# Patient Record
Sex: MALE | Race: WHITE | NOT HISPANIC OR LATINO | Employment: OTHER | URBAN - METROPOLITAN AREA
[De-identification: names, ages, dates, MRNs, and addresses within clinical notes are randomized per-mention and may not be internally consistent; named-entity substitution may affect disease eponyms.]

---

## 2021-12-16 ENCOUNTER — HOSPITAL ENCOUNTER (OUTPATIENT)
Facility: HOSPITAL | Age: 41
Discharge: HOME | End: 2021-12-18
Attending: EMERGENCY MEDICINE | Admitting: INTERNAL MEDICINE

## 2021-12-16 DIAGNOSIS — K62.5 RECTAL BLEEDING: ICD-10-CM

## 2021-12-16 DIAGNOSIS — D72.829 LEUKOCYTOSIS, UNSPECIFIED TYPE: ICD-10-CM

## 2021-12-16 DIAGNOSIS — K52.9 COLITIS: Primary | ICD-10-CM

## 2021-12-16 DIAGNOSIS — R19.8 CHANGE IN BOWEL MOVEMENT: ICD-10-CM

## 2021-12-16 PROCEDURE — 96375 TX/PRO/DX INJ NEW DRUG ADDON: CPT

## 2021-12-16 PROCEDURE — 99284 EMERGENCY DEPT VISIT MOD MDM: CPT

## 2021-12-16 PROCEDURE — 25010000002 ONDANSETRON PER 1 MG: Performed by: EMERGENCY MEDICINE

## 2021-12-16 PROCEDURE — 80053 COMPREHEN METABOLIC PANEL: CPT | Performed by: EMERGENCY MEDICINE

## 2021-12-16 PROCEDURE — 83690 ASSAY OF LIPASE: CPT | Performed by: EMERGENCY MEDICINE

## 2021-12-16 PROCEDURE — 25010000002 MORPHINE PER 10 MG: Performed by: EMERGENCY MEDICINE

## 2021-12-16 PROCEDURE — 85025 COMPLETE CBC W/AUTO DIFF WBC: CPT | Performed by: EMERGENCY MEDICINE

## 2021-12-16 RX ORDER — SODIUM CHLORIDE 0.9 % (FLUSH) 0.9 %
10 SYRINGE (ML) INJECTION AS NEEDED
Status: DISCONTINUED | OUTPATIENT
Start: 2021-12-16 | End: 2021-12-18 | Stop reason: HOSPADM

## 2021-12-16 RX ORDER — MORPHINE SULFATE 2 MG/ML
4 INJECTION, SOLUTION INTRAMUSCULAR; INTRAVENOUS ONCE
Status: COMPLETED | OUTPATIENT
Start: 2021-12-16 | End: 2021-12-16

## 2021-12-16 RX ORDER — ONDANSETRON 2 MG/ML
4 INJECTION INTRAMUSCULAR; INTRAVENOUS ONCE
Status: COMPLETED | OUTPATIENT
Start: 2021-12-16 | End: 2021-12-16

## 2021-12-16 RX ADMIN — SODIUM CHLORIDE, POTASSIUM CHLORIDE, SODIUM LACTATE AND CALCIUM CHLORIDE 1000 ML: 600; 310; 30; 20 INJECTION, SOLUTION INTRAVENOUS at 23:45

## 2021-12-16 RX ADMIN — MORPHINE SULFATE 4 MG: 2 INJECTION, SOLUTION INTRAMUSCULAR; INTRAVENOUS at 23:48

## 2021-12-16 RX ADMIN — ONDANSETRON 4 MG: 2 INJECTION INTRAMUSCULAR; INTRAVENOUS at 23:48

## 2021-12-17 ENCOUNTER — APPOINTMENT (OUTPATIENT)
Dept: CT IMAGING | Facility: HOSPITAL | Age: 41
End: 2021-12-17

## 2021-12-17 ENCOUNTER — PREP FOR SURGERY (OUTPATIENT)
Dept: OTHER | Facility: HOSPITAL | Age: 41
End: 2021-12-17

## 2021-12-17 DIAGNOSIS — R19.8 CHANGE IN BOWEL MOVEMENT: Primary | ICD-10-CM

## 2021-12-17 PROBLEM — K52.9 COLITIS: Status: ACTIVE | Noted: 2021-12-17

## 2021-12-17 PROBLEM — K62.5 RECTAL BLEEDING: Status: ACTIVE | Noted: 2021-12-17

## 2021-12-17 LAB
ADV 40+41 DNA STL QL NAA+NON-PROBE: NOT DETECTED
ALBUMIN SERPL-MCNC: 4.2 G/DL (ref 3.5–5.2)
ALBUMIN/GLOB SERPL: 1.5 G/DL
ALP SERPL-CCNC: 21 U/L (ref 39–117)
ALT SERPL W P-5'-P-CCNC: 32 U/L (ref 1–41)
ANION GAP SERPL CALCULATED.3IONS-SCNC: 7.4 MMOL/L (ref 5–15)
ANION GAP SERPL CALCULATED.3IONS-SCNC: 9.8 MMOL/L (ref 5–15)
AST SERPL-CCNC: 18 U/L (ref 1–40)
ASTRO TYP 1-8 RNA STL QL NAA+NON-PROBE: NOT DETECTED
BASOPHILS # BLD AUTO: 0.04 10*3/MM3 (ref 0–0.2)
BASOPHILS # BLD AUTO: 0.04 10*3/MM3 (ref 0–0.2)
BASOPHILS NFR BLD AUTO: 0.2 % (ref 0–1.5)
BASOPHILS NFR BLD AUTO: 0.3 % (ref 0–1.5)
BILIRUB SERPL-MCNC: 0.5 MG/DL (ref 0–1.2)
BILIRUB UR QL STRIP: NEGATIVE
BUN SERPL-MCNC: 14 MG/DL (ref 6–20)
BUN SERPL-MCNC: 20 MG/DL (ref 6–20)
BUN/CREAT SERPL: 13.9 (ref 7–25)
BUN/CREAT SERPL: 17.9 (ref 7–25)
C CAYETANENSIS DNA STL QL NAA+NON-PROBE: NOT DETECTED
C COLI+JEJ+UPSA DNA STL QL NAA+NON-PROBE: NOT DETECTED
C DIFF TOX GENS STL QL NAA+PROBE: NEGATIVE
CALCIUM SPEC-SCNC: 8.6 MG/DL (ref 8.6–10.5)
CALCIUM SPEC-SCNC: 9.5 MG/DL (ref 8.6–10.5)
CHLORIDE SERPL-SCNC: 103 MMOL/L (ref 98–107)
CHLORIDE SERPL-SCNC: 103 MMOL/L (ref 98–107)
CLARITY UR: CLEAR
CO2 SERPL-SCNC: 26.6 MMOL/L (ref 22–29)
CO2 SERPL-SCNC: 29.2 MMOL/L (ref 22–29)
COLOR UR: YELLOW
CREAT SERPL-MCNC: 1.01 MG/DL (ref 0.76–1.27)
CREAT SERPL-MCNC: 1.12 MG/DL (ref 0.76–1.27)
CRYPTOSP DNA STL QL NAA+NON-PROBE: NOT DETECTED
D-LACTATE SERPL-SCNC: 0.8 MMOL/L (ref 0.5–2)
DEPRECATED RDW RBC AUTO: 42.3 FL (ref 37–54)
DEPRECATED RDW RBC AUTO: 42.7 FL (ref 37–54)
E HISTOLYT DNA STL QL NAA+NON-PROBE: NOT DETECTED
EAEC PAA PLAS AGGR+AATA ST NAA+NON-PRB: NOT DETECTED
EC STX1+STX2 GENES STL QL NAA+NON-PROBE: NOT DETECTED
EOSINOPHIL # BLD AUTO: 0.07 10*3/MM3 (ref 0–0.4)
EOSINOPHIL # BLD AUTO: 0.25 10*3/MM3 (ref 0–0.4)
EOSINOPHIL NFR BLD AUTO: 0.4 % (ref 0.3–6.2)
EOSINOPHIL NFR BLD AUTO: 1.8 % (ref 0.3–6.2)
EPEC EAE GENE STL QL NAA+NON-PROBE: NOT DETECTED
ERYTHROCYTE [DISTWIDTH] IN BLOOD BY AUTOMATED COUNT: 12.9 % (ref 12.3–15.4)
ERYTHROCYTE [DISTWIDTH] IN BLOOD BY AUTOMATED COUNT: 13.1 % (ref 12.3–15.4)
ETEC LTA+ST1A+ST1B TOX ST NAA+NON-PROBE: NOT DETECTED
G LAMBLIA DNA STL QL NAA+NON-PROBE: NOT DETECTED
GFR SERPL CREATININE-BSD FRML MDRD: 72 ML/MIN/1.73
GFR SERPL CREATININE-BSD FRML MDRD: 81 ML/MIN/1.73
GLOBULIN UR ELPH-MCNC: 2.8 GM/DL
GLUCOSE SERPL-MCNC: 102 MG/DL (ref 65–99)
GLUCOSE SERPL-MCNC: 118 MG/DL (ref 65–99)
GLUCOSE UR STRIP-MCNC: NEGATIVE MG/DL
HCT VFR BLD AUTO: 43.4 % (ref 37.5–51)
HCT VFR BLD AUTO: 51.2 % (ref 37.5–51)
HEMOCCULT STL QL: POSITIVE
HGB BLD-MCNC: 14.7 G/DL (ref 13–17.7)
HGB BLD-MCNC: 17 G/DL (ref 13–17.7)
HGB UR QL STRIP.AUTO: NEGATIVE
IMM GRANULOCYTES # BLD AUTO: 0.05 10*3/MM3 (ref 0–0.05)
IMM GRANULOCYTES # BLD AUTO: 0.15 10*3/MM3 (ref 0–0.05)
IMM GRANULOCYTES NFR BLD AUTO: 0.4 % (ref 0–0.5)
IMM GRANULOCYTES NFR BLD AUTO: 0.8 % (ref 0–0.5)
KETONES UR QL STRIP: NEGATIVE
LEUKOCYTE ESTERASE UR QL STRIP.AUTO: NEGATIVE
LIPASE SERPL-CCNC: 11 U/L (ref 13–60)
LYMPHOCYTES # BLD AUTO: 1.58 10*3/MM3 (ref 0.7–3.1)
LYMPHOCYTES # BLD AUTO: 1.97 10*3/MM3 (ref 0.7–3.1)
LYMPHOCYTES NFR BLD AUTO: 14.3 % (ref 19.6–45.3)
LYMPHOCYTES NFR BLD AUTO: 8.1 % (ref 19.6–45.3)
MCH RBC QN AUTO: 29.9 PG (ref 26.6–33)
MCH RBC QN AUTO: 30.1 PG (ref 26.6–33)
MCHC RBC AUTO-ENTMCNC: 33.2 G/DL (ref 31.5–35.7)
MCHC RBC AUTO-ENTMCNC: 33.9 G/DL (ref 31.5–35.7)
MCV RBC AUTO: 88.9 FL (ref 79–97)
MCV RBC AUTO: 90 FL (ref 79–97)
MONOCYTES # BLD AUTO: 1.1 10*3/MM3 (ref 0.1–0.9)
MONOCYTES # BLD AUTO: 1.46 10*3/MM3 (ref 0.1–0.9)
MONOCYTES NFR BLD AUTO: 7.5 % (ref 5–12)
MONOCYTES NFR BLD AUTO: 8 % (ref 5–12)
NEUTROPHILS NFR BLD AUTO: 10.33 10*3/MM3 (ref 1.7–7)
NEUTROPHILS NFR BLD AUTO: 16.25 10*3/MM3 (ref 1.7–7)
NEUTROPHILS NFR BLD AUTO: 75.2 % (ref 42.7–76)
NEUTROPHILS NFR BLD AUTO: 83 % (ref 42.7–76)
NITRITE UR QL STRIP: NEGATIVE
NOROVIRUS GI+II RNA STL QL NAA+NON-PROBE: NOT DETECTED
NRBC BLD AUTO-RTO: 0 /100 WBC (ref 0–0.2)
NRBC BLD AUTO-RTO: 0 /100 WBC (ref 0–0.2)
P SHIGELLOIDES DNA STL QL NAA+NON-PROBE: NOT DETECTED
PH UR STRIP.AUTO: 6.5 [PH] (ref 5–8)
PLATELET # BLD AUTO: 263 10*3/MM3 (ref 140–450)
PLATELET # BLD AUTO: 293 10*3/MM3 (ref 140–450)
PMV BLD AUTO: 9.3 FL (ref 6–12)
PMV BLD AUTO: 9.4 FL (ref 6–12)
POTASSIUM SERPL-SCNC: 3.8 MMOL/L (ref 3.5–5.2)
POTASSIUM SERPL-SCNC: 3.9 MMOL/L (ref 3.5–5.2)
PROT SERPL-MCNC: 7 G/DL (ref 6–8.5)
PROT UR QL STRIP: NEGATIVE
RBC # BLD AUTO: 4.88 10*6/MM3 (ref 4.14–5.8)
RBC # BLD AUTO: 5.69 10*6/MM3 (ref 4.14–5.8)
RVA RNA STL QL NAA+NON-PROBE: NOT DETECTED
S ENT+BONG DNA STL QL NAA+NON-PROBE: NOT DETECTED
SAPO I+II+IV+V RNA STL QL NAA+NON-PROBE: NOT DETECTED
SARS-COV-2 ORF1AB RESP QL NAA+PROBE: NOT DETECTED
SHIGELLA SP+EIEC IPAH ST NAA+NON-PROBE: NOT DETECTED
SODIUM SERPL-SCNC: 137 MMOL/L (ref 136–145)
SODIUM SERPL-SCNC: 142 MMOL/L (ref 136–145)
SP GR UR STRIP: <=1.005 (ref 1–1.03)
UROBILINOGEN UR QL STRIP: NORMAL
V CHOL+PARA+VUL DNA STL QL NAA+NON-PROBE: NOT DETECTED
V CHOLERAE DNA STL QL NAA+NON-PROBE: NOT DETECTED
WBC NRBC COR # BLD: 13.74 10*3/MM3 (ref 3.4–10.8)
WBC NRBC COR # BLD: 19.55 10*3/MM3 (ref 3.4–10.8)
Y ENTEROCOL DNA STL QL NAA+NON-PROBE: NOT DETECTED

## 2021-12-17 PROCEDURE — 25010000002 HYDROMORPHONE PER 4 MG: Performed by: EMERGENCY MEDICINE

## 2021-12-17 PROCEDURE — 99204 OFFICE O/P NEW MOD 45 MIN: CPT | Performed by: INTERNAL MEDICINE

## 2021-12-17 PROCEDURE — 87493 C DIFF AMPLIFIED PROBE: CPT | Performed by: INTERNAL MEDICINE

## 2021-12-17 PROCEDURE — 25010000002 MORPHINE PER 10 MG: Performed by: NURSE PRACTITIONER

## 2021-12-17 PROCEDURE — 96361 HYDRATE IV INFUSION ADD-ON: CPT

## 2021-12-17 PROCEDURE — 74177 CT ABD & PELVIS W/CONTRAST: CPT

## 2021-12-17 PROCEDURE — 25010000002 IOPAMIDOL 61 % SOLUTION: Performed by: EMERGENCY MEDICINE

## 2021-12-17 PROCEDURE — 0097U HC BIOFIRE FILMARRAY GI PANEL: CPT | Performed by: NURSE PRACTITIONER

## 2021-12-17 PROCEDURE — 96375 TX/PRO/DX INJ NEW DRUG ADDON: CPT

## 2021-12-17 PROCEDURE — G0378 HOSPITAL OBSERVATION PER HR: HCPCS

## 2021-12-17 PROCEDURE — 25010000002 ONDANSETRON PER 1 MG: Performed by: NURSE PRACTITIONER

## 2021-12-17 PROCEDURE — 96365 THER/PROPH/DIAG IV INF INIT: CPT

## 2021-12-17 PROCEDURE — U0004 COV-19 TEST NON-CDC HGH THRU: HCPCS | Performed by: EMERGENCY MEDICINE

## 2021-12-17 PROCEDURE — 85025 COMPLETE CBC W/AUTO DIFF WBC: CPT | Performed by: NURSE PRACTITIONER

## 2021-12-17 PROCEDURE — 36415 COLL VENOUS BLD VENIPUNCTURE: CPT | Performed by: NURSE PRACTITIONER

## 2021-12-17 PROCEDURE — 25010000002 CEFTRIAXONE PER 250 MG: Performed by: EMERGENCY MEDICINE

## 2021-12-17 PROCEDURE — 96376 TX/PRO/DX INJ SAME DRUG ADON: CPT

## 2021-12-17 PROCEDURE — 83605 ASSAY OF LACTIC ACID: CPT | Performed by: NURSE PRACTITIONER

## 2021-12-17 PROCEDURE — 82272 OCCULT BLD FECES 1-3 TESTS: CPT | Performed by: NURSE PRACTITIONER

## 2021-12-17 PROCEDURE — 81003 URINALYSIS AUTO W/O SCOPE: CPT | Performed by: EMERGENCY MEDICINE

## 2021-12-17 PROCEDURE — 80048 BASIC METABOLIC PNL TOTAL CA: CPT | Performed by: NURSE PRACTITIONER

## 2021-12-17 RX ORDER — SODIUM CHLORIDE 0.9 % (FLUSH) 0.9 %
10 SYRINGE (ML) INJECTION EVERY 12 HOURS SCHEDULED
Status: DISCONTINUED | OUTPATIENT
Start: 2021-12-17 | End: 2021-12-18 | Stop reason: HOSPADM

## 2021-12-17 RX ORDER — SODIUM CHLORIDE 9 MG/ML
100 INJECTION, SOLUTION INTRAVENOUS CONTINUOUS
Status: DISCONTINUED | OUTPATIENT
Start: 2021-12-17 | End: 2021-12-18 | Stop reason: HOSPADM

## 2021-12-17 RX ORDER — MORPHINE SULFATE 2 MG/ML
2 INJECTION, SOLUTION INTRAMUSCULAR; INTRAVENOUS EVERY 4 HOURS PRN
Status: DISCONTINUED | OUTPATIENT
Start: 2021-12-17 | End: 2021-12-17

## 2021-12-17 RX ORDER — ACETAMINOPHEN 325 MG/1
650 TABLET ORAL EVERY 4 HOURS PRN
Status: DISCONTINUED | OUTPATIENT
Start: 2021-12-17 | End: 2021-12-18 | Stop reason: HOSPADM

## 2021-12-17 RX ORDER — METRONIDAZOLE 500 MG/1
500 TABLET ORAL EVERY 8 HOURS SCHEDULED
Status: DISCONTINUED | OUTPATIENT
Start: 2021-12-17 | End: 2021-12-18 | Stop reason: HOSPADM

## 2021-12-17 RX ORDER — HYDROMORPHONE HYDROCHLORIDE 1 MG/ML
0.5 INJECTION, SOLUTION INTRAMUSCULAR; INTRAVENOUS; SUBCUTANEOUS ONCE
Status: COMPLETED | OUTPATIENT
Start: 2021-12-17 | End: 2021-12-17

## 2021-12-17 RX ORDER — HYDROCODONE BITARTRATE AND ACETAMINOPHEN 7.5; 325 MG/1; MG/1
1 TABLET ORAL EVERY 4 HOURS PRN
Status: DISCONTINUED | OUTPATIENT
Start: 2021-12-17 | End: 2021-12-18 | Stop reason: HOSPADM

## 2021-12-17 RX ORDER — SODIUM CHLORIDE 0.9 % (FLUSH) 0.9 %
10 SYRINGE (ML) INJECTION AS NEEDED
Status: DISCONTINUED | OUTPATIENT
Start: 2021-12-17 | End: 2021-12-18 | Stop reason: HOSPADM

## 2021-12-17 RX ORDER — ACETAMINOPHEN 650 MG/1
650 SUPPOSITORY RECTAL EVERY 4 HOURS PRN
Status: DISCONTINUED | OUTPATIENT
Start: 2021-12-17 | End: 2021-12-18 | Stop reason: HOSPADM

## 2021-12-17 RX ORDER — ACETAMINOPHEN 160 MG/5ML
650 SOLUTION ORAL EVERY 4 HOURS PRN
Status: DISCONTINUED | OUTPATIENT
Start: 2021-12-17 | End: 2021-12-18 | Stop reason: HOSPADM

## 2021-12-17 RX ORDER — MORPHINE SULFATE 2 MG/ML
2 INJECTION, SOLUTION INTRAMUSCULAR; INTRAVENOUS
Status: DISCONTINUED | OUTPATIENT
Start: 2021-12-17 | End: 2021-12-18 | Stop reason: HOSPADM

## 2021-12-17 RX ORDER — NALOXONE HCL 0.4 MG/ML
0.4 VIAL (ML) INJECTION
Status: DISCONTINUED | OUTPATIENT
Start: 2021-12-17 | End: 2021-12-18 | Stop reason: HOSPADM

## 2021-12-17 RX ORDER — PANTOPRAZOLE SODIUM 40 MG/10ML
40 INJECTION, POWDER, LYOPHILIZED, FOR SOLUTION INTRAVENOUS
Status: DISCONTINUED | OUTPATIENT
Start: 2021-12-17 | End: 2021-12-18 | Stop reason: HOSPADM

## 2021-12-17 RX ORDER — ONDANSETRON 2 MG/ML
4 INJECTION INTRAMUSCULAR; INTRAVENOUS EVERY 6 HOURS PRN
Status: DISCONTINUED | OUTPATIENT
Start: 2021-12-17 | End: 2021-12-18 | Stop reason: HOSPADM

## 2021-12-17 RX ORDER — BUDESONIDE 3 MG/1
9 CAPSULE, COATED PELLETS ORAL DAILY
Status: DISCONTINUED | OUTPATIENT
Start: 2021-12-17 | End: 2021-12-18 | Stop reason: HOSPADM

## 2021-12-17 RX ORDER — NALOXONE HCL 0.4 MG/ML
0.4 VIAL (ML) INJECTION
Status: DISCONTINUED | OUTPATIENT
Start: 2021-12-17 | End: 2021-12-17

## 2021-12-17 RX ORDER — METRONIDAZOLE 500 MG/1
500 TABLET ORAL ONCE
Status: COMPLETED | OUTPATIENT
Start: 2021-12-17 | End: 2021-12-17

## 2021-12-17 RX ADMIN — ONDANSETRON 4 MG: 2 INJECTION INTRAMUSCULAR; INTRAVENOUS at 06:48

## 2021-12-17 RX ADMIN — IOPAMIDOL 85 ML: 612 INJECTION, SOLUTION INTRAVENOUS at 00:53

## 2021-12-17 RX ADMIN — HYDROMORPHONE HYDROCHLORIDE 0.5 MG: 1 INJECTION, SOLUTION INTRAMUSCULAR; INTRAVENOUS; SUBCUTANEOUS at 01:49

## 2021-12-17 RX ADMIN — METRONIDAZOLE 500 MG: 500 TABLET, FILM COATED ORAL at 21:08

## 2021-12-17 RX ADMIN — CEFTRIAXONE 1 G: 1 INJECTION, POWDER, FOR SOLUTION INTRAMUSCULAR; INTRAVENOUS at 01:47

## 2021-12-17 RX ADMIN — METRONIDAZOLE 500 MG: 500 TABLET ORAL at 01:46

## 2021-12-17 RX ADMIN — SODIUM CHLORIDE 100 ML/HR: 9 INJECTION, SOLUTION INTRAVENOUS at 03:50

## 2021-12-17 RX ADMIN — MORPHINE SULFATE 2 MG: 2 INJECTION, SOLUTION INTRAMUSCULAR; INTRAVENOUS at 04:14

## 2021-12-17 RX ADMIN — BUDESONIDE 9 MG: 3 CAPSULE ORAL at 14:26

## 2021-12-17 RX ADMIN — PANTOPRAZOLE SODIUM 40 MG: 40 INJECTION, POWDER, FOR SOLUTION INTRAVENOUS at 06:40

## 2021-12-17 RX ADMIN — ONDANSETRON 4 MG: 2 INJECTION INTRAMUSCULAR; INTRAVENOUS at 18:02

## 2021-12-17 RX ADMIN — METRONIDAZOLE 500 MG: 500 TABLET, FILM COATED ORAL at 09:27

## 2021-12-17 RX ADMIN — MORPHINE SULFATE 2 MG: 2 INJECTION, SOLUTION INTRAMUSCULAR; INTRAVENOUS at 06:48

## 2021-12-17 RX ADMIN — HYDROCODONE BITARTRATE AND ACETAMINOPHEN 1 TABLET: 7.5; 325 TABLET ORAL at 14:35

## 2021-12-17 RX ADMIN — SODIUM CHLORIDE, PRESERVATIVE FREE 10 ML: 5 INJECTION INTRAVENOUS at 09:28

## 2021-12-17 RX ADMIN — HYDROCODONE BITARTRATE AND ACETAMINOPHEN 1 TABLET: 7.5; 325 TABLET ORAL at 21:08

## 2021-12-17 RX ADMIN — MORPHINE SULFATE 2 MG: 2 INJECTION, SOLUTION INTRAMUSCULAR; INTRAVENOUS at 09:50

## 2021-12-17 RX ADMIN — SODIUM CHLORIDE 100 ML/HR: 9 INJECTION, SOLUTION INTRAVENOUS at 14:35

## 2021-12-17 NOTE — ED NOTES
"Nursing report ED to floor  Ole Mackay  41 y.o.  male    HPI :   Chief Complaint   Patient presents with   • Rash   • Lower Extremity Issue       Admitting doctor:   Kb Stafford MD    Admitting diagnosis:   The primary encounter diagnosis was Colitis. Diagnoses of Rectal bleeding and Leukocytosis, unspecified type were also pertinent to this visit.    Code status:   Current Code Status     Date Active Code Status Order ID Comments User Context       12/17/2021 0204 CPR (Attempt to Resuscitate) [id number]  Catherine Rodgers, APRN ED     Advance Care Planning Activity      Questions for Current Code Status     Question Answer    Code Status (Patient has no pulse and is not breathing) CPR (Attempt to Resuscitate)    Medical Interventions (Patient has pulse or is breathing) Full Support          Allergies:   Patient has no known allergies.    Intake and Output  No intake or output data in the 24 hours ending 12/17/21 0219    Weight:   There were no vitals filed for this visit.    Most recent vitals:   Vitals:    12/16/21 2108 12/16/21 2111 12/16/21 2352 12/16/21 2356   BP:  128/82 109/71 99/57   BP Location:  Right arm     Patient Position:  Standing     Pulse: 99   65   Resp: 16      Temp: 98 °F (36.7 °C)      TempSrc: Temporal      SpO2: 97%   98%   Height: 175.3 cm (69\")          Active LDAs/IV Access:   Lines, Drains & Airways     Active LDAs     Name Placement date Placement time Site Days    Peripheral IV 12/16/21 2344 Right Antecubital 12/16/21  2344  Antecubital  less than 1                Labs (abnormal labs have a star):   Labs Reviewed   COMPREHENSIVE METABOLIC PANEL - Abnormal; Notable for the following components:       Result Value    Glucose 102 (*)     CO2 29.2 (*)     Alkaline Phosphatase 21 (*)     All other components within normal limits    Narrative:     GFR Normal >60  Chronic Kidney Disease <60  Kidney Failure <15     LIPASE - Abnormal; Notable for the following components:    Lipase " 11 (*)     All other components within normal limits   CBC WITH AUTO DIFFERENTIAL - Abnormal; Notable for the following components:    WBC 19.55 (*)     Hematocrit 51.2 (*)     Neutrophil % 83.0 (*)     Lymphocyte % 8.1 (*)     Immature Grans % 0.8 (*)     Neutrophils, Absolute 16.25 (*)     Monocytes, Absolute 1.46 (*)     Immature Grans, Absolute 0.15 (*)     All other components within normal limits   URINALYSIS W/ MICROSCOPIC IF INDICATED (NO CULTURE) - Normal    Narrative:     Urine microscopic not indicated.   COVID PRE-OP / PRE-PROCEDURE SCREENING ORDER (NO ISOLATION)    Narrative:     The following orders were created for panel order COVID PRE-OP / PRE-PROCEDURE SCREENING ORDER (NO ISOLATION) - Swab, Nasopharynx.  Procedure                               Abnormality         Status                     ---------                               -----------         ------                     COVID-19,APTIMA PANTHER(...[[id number]]                      In process                   Please view results for these tests on the individual orders.   COVID-19,APTIMA PANTHER ([facility], NP/OP SWAB IN UTM/VTM/SALINE TRANSPORT MEDIA,24 HR TAT   GASTROINTESTINAL PANEL, PCR   BASIC METABOLIC PANEL   CBC WITH AUTO DIFFERENTIAL   LACTIC ACID, PLASMA   CBC AND DIFFERENTIAL    Narrative:     The following orders were created for panel order CBC & Differential.  Procedure                               Abnormality         Status                     ---------                               -----------         ------                     CBC Auto Differential[[id number]]        Abnormal            Final result                 Please view results for these tests on the individual orders.       EKG:   No orders to display       Meds given in ED:   Medications   sodium chloride 0.9 % flush 10 mL (has no administration in time range)   sodium chloride 0.9 % flush 10 mL (has no administration in time range)   sodium chloride 0.9 % flush  10 mL (has no administration in time range)   sodium chloride 0.9 % infusion (has no administration in time range)   acetaminophen (TYLENOL) tablet 650 mg (has no administration in time range)     Or   acetaminophen (TYLENOL) 160 MG/5ML solution 650 mg (has no administration in time range)     Or   acetaminophen (TYLENOL) suppository 650 mg (has no administration in time range)   morphine injection 2 mg (has no administration in time range)     And   naloxone (NARCAN) injection 0.4 mg (has no administration in time range)   ondansetron (ZOFRAN) injection 4 mg (has no administration in time range)   metroNIDAZOLE (FLAGYL) tablet 500 mg (has no administration in time range)   lactated ringers bolus 1,000 mL (1,000 mL Intravenous New Bag 12/16/21 2345)   ondansetron (ZOFRAN) injection 4 mg (4 mg Intravenous Given 12/16/21 2348)   morphine injection 4 mg (4 mg Intravenous Given 12/16/21 2348)   iopamidol (ISOVUE-300) 61 % injection 100 mL (85 mL Intravenous Given 12/17/21 0053)   HYDROmorphone (DILAUDID) injection 0.5 mg (0.5 mg Intravenous Given 12/17/21 0149)   cefTRIAXone (ROCEPHIN) 1 g in sodium chloride 0.9 % 100 mL IVPB-VTB (1 g Intravenous New Bag 12/17/21 0147)   metroNIDAZOLE (FLAGYL) tablet 500 mg (500 mg Oral Given 12/17/21 0146)       Imaging results:  CT Abdomen Pelvis With Contrast    Result Date: 12/17/2021  Colitis, extending from the splenic flexure to the rectum.  Radiation dose reduction techniques were utilized, including automated exposure control and exposure modulation based on body size.  This report was finalized on 12/17/2021 1:28 AM by Dr. Alejandra Patricio M.D.        Ambulatory status:   Up ad ainsley     Social issues:   Social History     Socioeconomic History   • Marital status:        NIH Stroke Scale:        Nursing report ED to floor:       Jacinta Jennings, RN  12/17/21 7620

## 2021-12-17 NOTE — PLAN OF CARE
Goal Outcome Evaluation:  Plan of Care Reviewed With: patient        Progress: no change  Outcome Summary: admitted from ED with diagnosis of colitis, c/o abdominal cramping and intermittent nausea, passed small bloody stool and specimen sent to lab, vss, voiding, discharge plans are pending

## 2021-12-17 NOTE — NURSING NOTE
Spoke to Arturo in lab w/regards to labs ordered @0215; he said there are only 4 phlebotomists, they have a lot of pts to see, and they are on the floor

## 2021-12-17 NOTE — ED PROVIDER NOTES
EMERGENCY DEPARTMENT ENCOUNTER    Room Number:  24/24  Date of encounter:  12/17/2021  PCP: Provider, No Known  Historian: Patient     I used full protective equipment while examining this patient.  This includes face mask, gloves and protective eyewear.  I washed my hands before entering the room and immediately upon leaving the room.  Patient was wearing a surgical mask.      HPI:  Chief Complaint: Rectal bleeding  A complete HPI/ROS/PMH/PSH/SH/FH are unobtainable due to: None    Context: Ole Mackay is a 41 y.o. male, with a history of colitis, who presents to the ED c/o rectal bleeding that began several hours ago.  He initially had several episodes of watery diarrhea and then began to have rectal bleeding.  Also reports generalized abdominal cramping which is constant but waxing and waning.  Also had nausea and an episode of vomiting.  Pain is mild to moderate.  Nothing makes it better or worse.  Patient got his Covid booster this afternoon.  Also reports having a red rash on his trunk and lower extremities this afternoon that has now resolved.  Denies cough, fever, chest  pain, shortness of breath, dysuria, or recent antibiotic use.      PAST MEDICAL HISTORY  Active Ambulatory Problems     Diagnosis Date Noted   • No Active Ambulatory Problems     Resolved Ambulatory Problems     Diagnosis Date Noted   • No Resolved Ambulatory Problems     No Additional Past Medical History         PAST SURGICAL HISTORY  History reviewed. No pertinent surgical history.      FAMILY HISTORY  History reviewed. No pertinent family history.      SOCIAL HISTORY  Social History     Socioeconomic History   • Marital status:          ALLERGIES  Patient has no known allergies.       REVIEW OF SYSTEMS  Review of Systems      All systems have been reviewed and are negative except as as discussed in the HPI    PHYSICAL EXAM    I have reviewed the triage vital signs and nursing notes.    ED Triage Vitals   Temp Heart Rate Resp  BP SpO2   12/16/21 2108 12/16/21 2108 12/16/21 2108 12/16/21 2111 12/16/21 2108   98 °F (36.7 °C) 99 16 128/82 97 %      Temp src Heart Rate Source Patient Position BP Location FiO2 (%)   12/16/21 2108 12/16/21 2108 12/16/21 2111 12/16/21 2111 --   Temporal Monitor Standing Right arm        Physical Exam  GENERAL: Awake, alert, oriented x3, in no acute distress  HENT: NCAT, nares patent, moist mucous membranes  NECK: supple  EYES: no scleral icterus  CV: regular rhythm, regular rate  RESPIRATORY: normal effort, clear to auscultation bilaterally  ABDOMEN: soft, mild generalized tenderness without rebound or guarding, no CVA tenderness  MUSCULOSKELETAL: Extremities are nontender and without obvious deformity.  There is normal range of motion in all extremities.    NEURO: Strength, sensation, and coordination are grossly intact.  Speech and mentation are unremarkable.  No facial droop.  SKIN: warm, dry, no rash  PSYCH: Normal mood and affect      LAB RESULTS  Recent Results (from the past 24 hour(s))   Comprehensive Metabolic Panel    Collection Time: 12/16/21 11:42 PM    Specimen: Blood   Result Value Ref Range    Glucose 102 (H) 65 - 99 mg/dL    BUN 20 6 - 20 mg/dL    Creatinine 1.12 0.76 - 1.27 mg/dL    Sodium 142 136 - 145 mmol/L    Potassium 3.9 3.5 - 5.2 mmol/L    Chloride 103 98 - 107 mmol/L    CO2 29.2 (H) 22.0 - 29.0 mmol/L    Calcium 9.5 8.6 - 10.5 mg/dL    Total Protein 7.0 6.0 - 8.5 g/dL    Albumin 4.20 3.50 - 5.20 g/dL    ALT (SGPT) 32 1 - 41 U/L    AST (SGOT) 18 1 - 40 U/L    Alkaline Phosphatase 21 (L) 39 - 117 U/L    Total Bilirubin 0.5 0.0 - 1.2 mg/dL    eGFR Non African Amer 72 >60 mL/min/1.73    Globulin 2.8 gm/dL    A/G Ratio 1.5 g/dL    BUN/Creatinine Ratio 17.9 7.0 - 25.0    Anion Gap 9.8 5.0 - 15.0 mmol/L   Lipase    Collection Time: 12/16/21 11:42 PM    Specimen: Blood   Result Value Ref Range    Lipase 11 (L) 13 - 60 U/L   CBC Auto Differential    Collection Time: 12/16/21 11:42 PM    Specimen:  Blood   Result Value Ref Range    WBC 19.55 (H) 3.40 - 10.80 10*3/mm3    RBC 5.69 4.14 - 5.80 10*6/mm3    Hemoglobin 17.0 13.0 - 17.7 g/dL    Hematocrit 51.2 (H) 37.5 - 51.0 %    MCV 90.0 79.0 - 97.0 fL    MCH 29.9 26.6 - 33.0 pg    MCHC 33.2 31.5 - 35.7 g/dL    RDW 13.1 12.3 - 15.4 %    RDW-SD 42.7 37.0 - 54.0 fl    MPV 9.4 6.0 - 12.0 fL    Platelets 293 140 - 450 10*3/mm3    Neutrophil % 83.0 (H) 42.7 - 76.0 %    Lymphocyte % 8.1 (L) 19.6 - 45.3 %    Monocyte % 7.5 5.0 - 12.0 %    Eosinophil % 0.4 0.3 - 6.2 %    Basophil % 0.2 0.0 - 1.5 %    Immature Grans % 0.8 (H) 0.0 - 0.5 %    Neutrophils, Absolute 16.25 (H) 1.70 - 7.00 10*3/mm3    Lymphocytes, Absolute 1.58 0.70 - 3.10 10*3/mm3    Monocytes, Absolute 1.46 (H) 0.10 - 0.90 10*3/mm3    Eosinophils, Absolute 0.07 0.00 - 0.40 10*3/mm3    Basophils, Absolute 0.04 0.00 - 0.20 10*3/mm3    Immature Grans, Absolute 0.15 (H) 0.00 - 0.05 10*3/mm3    nRBC 0.0 0.0 - 0.2 /100 WBC   Urinalysis With Microscopic If Indicated (No Culture) - Urine, Clean Catch    Collection Time: 12/17/21 12:29 AM    Specimen: Urine, Clean Catch   Result Value Ref Range    Color, UA Yellow Yellow, Straw    Appearance, UA Clear Clear    pH, UA 6.5 5.0 - 8.0    Specific Gravity, UA <=1.005 1.005 - 1.030    Glucose, UA Negative Negative    Ketones, UA Negative Negative    Bilirubin, UA Negative Negative    Blood, UA Negative Negative    Protein, UA Negative Negative    Leuk Esterase, UA Negative Negative    Nitrite, UA Negative Negative    Urobilinogen, UA 0.2 E.U./dL 0.2 - 1.0 E.U./dL       Ordered the above labs and independently reviewed the results.      RADIOLOGY  CT Abdomen Pelvis With Contrast    Result Date: 12/17/2021  CT OF THE ABDOMEN AND PELVIS WITH CONTRAST  HISTORY: Generalized abdominal pain and rectal bleeding  COMPARISON: None available.  TECHNIQUE: Axial CT imaging was obtained through the abdomen and pelvis. IV contrast was administered.  FINDINGS: Images through the lung bases  demonstrate some minimal dependent atelectasis. The stomach, duodenum, adrenal glands and spleen, pancreas, and gallbladder appear within normal limits. No focal hepatic lesions are seen. Kidneys enhance symmetrically. There is no hydronephrosis. No distal ureteral or bladder stones are seen. There is a fat-containing umbilical hernia. Prostate gland contains some dystrophic calcifications. The patient's colon, extending from the splenic flexure to the rectum is thick-walled. There is pericolonic soft tissue stranding. Appearance is characteristic of colitis. Patient also has diverticulosis. The appendix is normal. There is no evidence of obstruction. No pneumatosis or free air is seen. No acute osseous abnormalities are seen. Bilateral pars defects are noted at L5-S1, with mild spondylolisthesis noted.      Colitis, extending from the splenic flexure to the rectum.  Radiation dose reduction techniques were utilized, including automated exposure control and exposure modulation based on body size.  This report was finalized on 12/17/2021 1:28 AM by Dr. Alejandra Patricio M.D.        I ordered the above noted radiological studies. Reviewed by me and discussed with radiologist.  See dictation for official radiology interpretation.      PROCEDURES  Procedures      MEDICATIONS GIVEN IN ER    Medications   sodium chloride 0.9 % flush 10 mL (has no administration in time range)   cefTRIAXone (ROCEPHIN) 1 g in sodium chloride 0.9 % 100 mL IVPB-VTB (1 g Intravenous New Bag 12/17/21 0147)   sodium chloride 0.9 % flush 10 mL (has no administration in time range)   sodium chloride 0.9 % flush 10 mL (has no administration in time range)   sodium chloride 0.9 % infusion (has no administration in time range)   acetaminophen (TYLENOL) tablet 650 mg (has no administration in time range)     Or   acetaminophen (TYLENOL) 160 MG/5ML solution 650 mg (has no administration in time range)     Or   acetaminophen (TYLENOL) suppository 650 mg  (has no administration in time range)   morphine injection 2 mg (has no administration in time range)     And   naloxone (NARCAN) injection 0.4 mg (has no administration in time range)   ondansetron (ZOFRAN) injection 4 mg (has no administration in time range)   metroNIDAZOLE (FLAGYL) tablet 500 mg (has no administration in time range)   lactated ringers bolus 1,000 mL (1,000 mL Intravenous New Bag 12/16/21 2345)   ondansetron (ZOFRAN) injection 4 mg (4 mg Intravenous Given 12/16/21 2348)   morphine injection 4 mg (4 mg Intravenous Given 12/16/21 2348)   iopamidol (ISOVUE-300) 61 % injection 100 mL (85 mL Intravenous Given 12/17/21 0053)   HYDROmorphone (DILAUDID) injection 0.5 mg (0.5 mg Intravenous Given 12/17/21 0149)   metroNIDAZOLE (FLAGYL) tablet 500 mg (500 mg Oral Given 12/17/21 0146)         PROGRESS, DATA ANALYSIS, CONSULTS, AND MEDICAL DECISION MAKING    All labs have been independently reviewed by me.  All radiology studies have been reviewed by me and discussed with radiologist dictating the report.   EKG's independently viewed and interpreted by me.  I have reviewed the nurse's notes, vital signs, past medical history, and medication list.  Discussion below represents my analysis of pertinent findings related to patient's condition, differential diagnosis, treatment plan and final disposition.      Differential diagnosis includes but is not limited to:  - hepatobiliary pathology such as cholecystitis, cholangitis, and symptomatic cholelithiasis  - Pancreatitis  - Dyspepsia  - Small bowel obstruction  - Appendicitis  - Diverticulitis  - UTI including pyelonephritis  - Ureteral stone  - Zoster  - Colitis, including infectious and ischemic  - Atypical ACS      ED Course as of 12/17/21 0205   u Dec 16, 2021   2313 Old records reviewed.  Patient does not have any prior ED visits or admissions here. [[facility]]   Fri Dec 17, 2021   0024 WBC(!): 19.55 [WH]   0132 CT abdomen/pelvis interpreted by the radiologist.   Images independently viewed by me.  There is wall thickening of the colon from the splenic flexure to the rectum.  There is pericolonic soft tissue stranding.  There is diverticulosis without evidence of diverticulitis.  Findings are consistent with colitis.  See dictated report for details. []   0136 Test results discussed with the patient.  Reports that his pain had improved but is now returning.  Shared decision-making was discussed and he prefers to be admitted.  Call will be placed to [facility]. []   0156 Case discussed with MARGARITA Alexander, and she agrees to admit the patient to Dr. Pulido.  Pertinent exam findings, test results, ED course, and diagnoses were discussed with her.  Patient will be admitted to a [facility] bed. []      ED Course User Index  [] Paxton Guy MD       AS OF 02:05 EST VITALS:    BP - 99/57  HR - 65  TEMP - 98 °F (36.7 °C) (Temporal)  O2 SATS - 98%      DIAGNOSIS  Final diagnoses:   Colitis   Rectal bleeding   Leukocytosis, unspecified type         DISPOSITION  Admission    ADMISSION    Discussed treatment plan and reason for admission with pt/family and admitting physician.  Pt/family voiced understanding of the plan for admission for further testing/treatment as needed.         Dictated utilizing Dragon dictation:  Much of this encounter note is an electronic transcription/translation of spoken language to printed text. The electronic translation of spoken language may permit erroneous, or at times, nonsensical words or phrases to be inadvertently transcribed; Although I have reviewed the note for such errors, some may still exist.     Paxton Guy MD  12/17/21 0201

## 2021-12-17 NOTE — H&P
Patient Name:  Ole Mackay  YOB: 1980  MRN:  [id number]  Admit Date:  12/16/2021  Patient Care Team:  Provider, No Known as PCP - General      Subjective   History Present Illness     Chief Complaint   Patient presents with   • Rash   • Lower Extremity Issue     History of Present Illness   Mr. Mackay is a 41-year-old male with no significant medical history who presents to the emergency room with complaints of rectal bleeding.  Patient states that earlier today he had sudden episode of several episodes of watery diarrhea and then began to have some rectal bleeding.  He also had some generalized abdominal cramping, nothing seems to make it worse or better.  He also had some nausea and one episode of vomiting.  Patient did receive his COVID-19 booster this afternoon prior to onset.  He also reports that he had a rash on his chest stomach and lower extremities this afternoon but that resolved.  He denies any fever, cough, shortness of breath, no recent antibiotic use.  No significant abdominal history.  He states he has had infectious colitis in the past with diarrhea, he has had about 4 episodes in the 15 years where he needed to be hospitalized, has had other issues but has learned to manage his symptoms at home with decreased p.o. intake.  He is not currently follow with gastroenterology.  In the emergency room glucose 102, sodium 142, creatinine 1.12, BUN 20, lipase 11, white blood cell count 19.5, hemoglobin 17, hematocrit 51.2, platelets 293.  Urinalysis is negative.  COVID-19 test is pending although patient has no symptoms and has had no exposure.  ET of the abdomen shows colitis extending from the splenic flexure to the rectum.    Review of Systems   Constitutional: Negative for appetite change and fever.   HENT: Negative for nosebleeds and trouble swallowing.    Eyes: Negative for photophobia, redness and visual disturbance.   Respiratory: Negative for cough, chest tightness,  shortness of breath and wheezing.    Cardiovascular: Negative for chest pain, palpitations and leg swelling.   Gastrointestinal: Positive for abdominal pain, blood in stool and diarrhea. Negative for abdominal distention, nausea and vomiting.   Endocrine: Negative.    Genitourinary: Negative.    Musculoskeletal: Negative for gait problem and joint swelling.   Skin: Negative.    Neurological: Negative for dizziness, seizures, speech difficulty, light-headedness and headaches.   Hematological: Negative.    Psychiatric/Behavioral: Negative for behavioral problems and confusion.        Personal History     History reviewed. No pertinent past medical history.  History reviewed. No pertinent surgical history.  History reviewed. No pertinent family history.  Social History     Tobacco Use   • Smoking status: Not on file   • Smokeless tobacco: Not on file   Substance Use Topics   • Alcohol use: Not on file   • Drug use: Not on file     No current facility-administered medications on file prior to encounter.     No current outpatient medications on file prior to encounter.     No Known Allergies    Objective    Objective     Vital Signs  Temp:  [97.2 °F (36.2 °C)-98 °F (36.7 °C)] 97.2 °F (36.2 °C)  Heart Rate:  [64-99] 64  Resp:  [16-18] 18  BP: ()/(57-83) 132/83  SpO2:  [96 %-98 %] 96 %  on   ;   Device (Oxygen Therapy): room air  Body mass index is 26.15 kg/m².    Physical Exam  Vitals and nursing note reviewed.   Constitutional:       General: He is not in acute distress.     Appearance: He is well-developed.   HENT:      Head: Normocephalic.   Neck:      Vascular: No JVD.   Cardiovascular:      Rate and Rhythm: Normal rate and regular rhythm.      Heart sounds: Normal heart sounds.   Pulmonary:      Effort: Pulmonary effort is normal.      Breath sounds: Normal breath sounds.      Comments: Lung sounds clear, sats 97% on room air  Abdominal:      General: Bowel sounds are normal. There is no distension.       Palpations: Abdomen is soft.      Tenderness: There is generalized abdominal tenderness.      Comments: Mild generalized tenderness, no rebound tenderness, no guarding, continues to have mild nausea   Musculoskeletal:         General: Normal range of motion.      Cervical back: Normal range of motion.   Skin:     General: Skin is warm and dry.      Capillary Refill: Capillary refill takes less than 2 seconds.   Neurological:      General: No focal deficit present.      Mental Status: He is alert and oriented to person, place, and time.   Psychiatric:         Attention and Perception: Attention normal.         Mood and Affect: Mood normal.         Behavior: Behavior normal.         Cognition and Memory: Cognition normal.         Judgment: Judgment normal.         Results Review:  I reviewed the patient's new clinical results.  I reviewed the patient's new imaging results and agree with the interpretation.  I reviewed the patient's other test results and agree with the interpretation  I personally viewed and interpreted the patient's EKG/Telemetry data  Discussed with ED provider.    Lab Results (last 24 hours)     Procedure Component Value Units Date/Time    CBC & Differential [[id number]]  (Abnormal) Collected: 12/16/21 2342    Specimen: Blood Updated: 12/17/21 0019    Narrative:      The following orders were created for panel order CBC & Differential.  Procedure                               Abnormality         Status                     ---------                               -----------         ------                     CBC Auto Differential[[id number]]        Abnormal            Final result                 Please view results for these tests on the individual orders.    Comprehensive Metabolic Panel [[id number]]  (Abnormal) Collected: 12/16/21 2342    Specimen: Blood Updated: 12/17/21 0020     Glucose 102 mg/dL      BUN 20 mg/dL      Creatinine 1.12 mg/dL      Sodium 142 mmol/L      Potassium 3.9 mmol/L       Chloride 103 mmol/L      CO2 29.2 mmol/L      Calcium 9.5 mg/dL      Total Protein 7.0 g/dL      Albumin 4.20 g/dL      ALT (SGPT) 32 U/L      AST (SGOT) 18 U/L      Alkaline Phosphatase 21 U/L      Total Bilirubin 0.5 mg/dL      eGFR Non African Amer 72 mL/min/1.73      Globulin 2.8 gm/dL      A/G Ratio 1.5 g/dL      BUN/Creatinine Ratio 17.9     Anion Gap 9.8 mmol/L     Narrative:      GFR Normal >60  Chronic Kidney Disease <60  Kidney Failure <15      Lipase [[id number]]  (Abnormal) Collected: 12/16/21 2342    Specimen: Blood Updated: 12/17/21 0020     Lipase 11 U/L     CBC Auto Differential [[id number]]  (Abnormal) Collected: 12/16/21 2342    Specimen: Blood Updated: 12/17/21 0019     WBC 19.55 10*3/mm3      RBC 5.69 10*6/mm3      Hemoglobin 17.0 g/dL      Hematocrit 51.2 %      MCV 90.0 fL      MCH 29.9 pg      MCHC 33.2 g/dL      RDW 13.1 %      RDW-SD 42.7 fl      MPV 9.4 fL      Platelets 293 10*3/mm3      Neutrophil % 83.0 %      Lymphocyte % 8.1 %      Monocyte % 7.5 %      Eosinophil % 0.4 %      Basophil % 0.2 %      Immature Grans % 0.8 %      Neutrophils, Absolute 16.25 10*3/mm3      Lymphocytes, Absolute 1.58 10*3/mm3      Monocytes, Absolute 1.46 10*3/mm3      Eosinophils, Absolute 0.07 10*3/mm3      Basophils, Absolute 0.04 10*3/mm3      Immature Grans, Absolute 0.15 10*3/mm3      nRBC 0.0 /100 WBC     Urinalysis With Microscopic If Indicated (No Culture) - Urine, Clean Catch [[id number]]  (Normal) Collected: 12/17/21 0029    Specimen: Urine, Clean Catch Updated: 12/17/21 0107     Color, UA Yellow     Appearance, UA Clear     pH, UA 6.5     Specific Gravity, UA <=1.005     Glucose, UA Negative     Ketones, UA Negative     Bilirubin, UA Negative     Blood, UA Negative     Protein, UA Negative     Leuk Esterase, UA Negative     Nitrite, UA Negative     Urobilinogen, UA 0.2 E.U./dL    Narrative:      Urine microscopic not indicated.    COVID PRE-OP / PRE-PROCEDURE SCREENING ORDER (NO ISOLATION) - Swab,  Nasopharynx [[id number]] Collected: 12/17/21 0151    Specimen: Swab from Nasopharynx Updated: 12/17/21 0155    Narrative:      The following orders were created for panel order COVID PRE-OP / PRE-PROCEDURE SCREENING ORDER (NO ISOLATION) - Swab, Nasopharynx.  Procedure                               Abnormality         Status                     ---------                               -----------         ------                     COVID-19,APTIMA PANTHER(...[[id number]]                      In process                   Please view results for these tests on the individual orders.    COVID-19,APTIMA PANTHER(GRANT),[facility], NP/OP SWAB IN UTM/VTM/SALINE TRANSPORT MEDIA,24 HR TAT - Swab, Nasopharynx [[id number]] Collected: 12/17/21 0151    Specimen: Swab from Nasopharynx Updated: 12/17/21 0155          Imaging Results (Last 24 Hours)     Procedure Component Value Units Date/Time    CT Abdomen Pelvis With Contrast [[id number]] Collected: 12/17/21 0124     Updated: 12/17/21 0131    Narrative:      CT OF THE ABDOMEN AND PELVIS WITH CONTRAST     HISTORY: Generalized abdominal pain and rectal bleeding     COMPARISON: None available.     TECHNIQUE: Axial CT imaging was obtained through the abdomen and pelvis.  IV contrast was administered.     FINDINGS:  Images through the lung bases demonstrate some minimal dependent  atelectasis. The stomach, duodenum, adrenal glands and spleen, pancreas,  and gallbladder appear within normal limits. No focal hepatic lesions  are seen. Kidneys enhance symmetrically. There is no hydronephrosis. No  distal ureteral or bladder stones are seen. There is a fat-containing  umbilical hernia. Prostate gland contains some dystrophic  calcifications. The patient's colon, extending from the splenic flexure  to the rectum is thick-walled. There is pericolonic soft tissue  stranding. Appearance is characteristic of colitis. Patient also has  diverticulosis. The appendix is normal. There is no evidence  of  obstruction. No pneumatosis or free air is seen. No acute osseous  abnormalities are seen. Bilateral pars defects are noted at L5-S1, with  mild spondylolisthesis noted.       Impression:      Colitis, extending from the splenic flexure to the rectum.     Radiation dose reduction techniques were utilized, including automated  exposure control and exposure modulation based on body size.     This report was finalized on 12/17/2021 1:28 AM by Dr. Alejandra Patricio M.D.                 No orders to display        Assessment/Plan     Active Hospital Problems    Diagnosis  POA   • **Colitis [K52.9]  Yes   • Rectal bleeding [K62.5]  Unknown     Mr. Mackay is a 41-year-old male with no significant medical history who presents to the emergency room with complaints of rectal bleeding.    Colitis/rectal bleeding  -Protonix IV daily  -Clear liquids  -Occult stool x1  -Check GI PCR  -BC, BMP in a.m.  -Morphine for pain control  -Patient started on Rocephin and Flagyl in the emergency room, will continue Flagyl  -Zofran for nausea control  -Consider GI consult if no improvement in symptoms in a.m.  -Check lactic acid    · I discussed the patient's findings and my recommendations with patient and ED provider.    VTE Prophylaxis - SCDs.  Code Status - Full code.       ALHAJI Chavarria  [facility]  12/17/21  03:27 EST

## 2021-12-17 NOTE — CONSULTS
Gastroenterology   Initial Inpatient Consult Note    Referring Provider: Rogelio Marley    Reason for Consultation: Colitis flare  Subjective     History of present illness:    41 y.o. male accompanied by his wife.  Very interesting history of greater than 10 years of colitis.  There has been no formal diagnosis of ulcerative colitis.  In fact, he has been specifically told that biopsies that he had on a colonoscopy in 2013 did not show that.  However, he has had symptoms for over the 10 years.  He has been hospitalized this representing his third hospitalization.  The initial hospitalization was in 2013 where he had evidence of colitis by imaging endoscopic view and then had another hospitalization for similar symptoms but did not have a colonoscopy at that time and is now admitted currently with symptoms increased white count and evidence of left-sided colitis on CT scan.  Patient reports that over the years he has eaten very clean he is able to recognize the onset of a flare and he is usually able to curb it with dietary modifications.  It usually begins with some obstipation bloating and then it releases with his dietary changes this one came on very sudden and very quick in fact it came on after receiving the dose of the booster shot for Covid yesterday which was also associated with a mild rash  Past Medical History:  History reviewed. No pertinent past medical history.  Past Surgical History:  History reviewed. No pertinent surgical history.   Social History:   Social History     Tobacco Use   • Smoking status: Never Smoker   • Smokeless tobacco: Never Used   Substance Use Topics   • Alcohol use: Not on file      Family History:  History reviewed. No pertinent family history.    Home Meds:  No medications prior to admission.     Current Meds:   metroNIDAZOLE, 500 mg, Oral, Q8H  pantoprazole, 40 mg, Intravenous, Q AM  sodium chloride, 10 mL, Intravenous, Q12H      Allergies:  No Known Allergies  Review of  Systems  Pertinent items are noted in HPI, all other systems reviewed and negative    Objective     Vital Signs  Temp:  [97.2 °F (36.2 °C)-98 °F (36.7 °C)] 97.9 °F (36.6 °C)  Heart Rate:  [64-99] 64  Resp:  [16-18] 16  BP: ()/(57-83) 99/60    Physical Exam:  CONSTITUTIONAL:  today's vital signs reviewed  EARS NOSE THROAT: trachea midline and no deformity of the nares  EYES: no scleral icterus  GASTROINTESTINAL: abdomen is soft, nontender, nondistended with normal active bowel sounds, no masses are appreciated  PSYCHIATRIC: appropriate mood and affect  RESPIRATORY: normal inspiratory effort with no increased work of breathing  NEUROLOGIC: patient is awake and alert  DERMATOLOGIC: skin is warm with no cyanosis  LYMPHATIC: no periumbilical lymphadenopathy     Results Review:              I reviewed the patient's new clinical results.    Results from last 7 days   Lab Units 12/17/21  0706 12/16/21  2342   WBC 10*3/mm3 13.74* 19.55*   HEMOGLOBIN g/dL 14.7 17.0   HEMATOCRIT % 43.4 51.2*   PLATELETS 10*3/mm3 263 293     Results from last 7 days   Lab Units 12/17/21  0706 12/16/21  2342   SODIUM mmol/L 137 142   POTASSIUM mmol/L 3.8 3.9   CHLORIDE mmol/L 103 103   CO2 mmol/L 26.6 29.2*   BUN mg/dL 14 20   CREATININE mg/dL 1.01 1.12   CALCIUM mg/dL 8.6 9.5   BILIRUBIN mg/dL  --  0.5   ALK PHOS U/L  --  21*   ALT (SGPT) U/L  --  32   AST (SGOT) U/L  --  18   GLUCOSE mg/dL 118* 102*         Lab Results   Lab Value Date/Time    LIPASE 11 (L) 12/16/2021 2342       Radiology:  CT Abdomen Pelvis With Contrast   Final Result   Colitis, extending from the splenic flexure to the rectum.       Radiation dose reduction techniques were utilized, including automated   exposure control and exposure modulation based on body size.       This report was finalized on 12/17/2021 1:28 AM by Dr. Alejandra Patricio M.D.              Assessment/Plan   Patient Active Problem List   Diagnosis   • Colitis   • Rectal bleeding        Assessment:  1. The patient has a history of colitis.  Was admitted with evidence of colitis on CT scan left-sided along with an elevated white count.  The patient clinically looks excellent today.  He actually feels like things have improved as well.  He is not having diarrhea or significant bleeding.  His bloating is improved and he has only needed one dose of pain medication this morning.  We had an extensive discussion today regarding the fact that there is some ambiguity around his diagnosis.  He should not have a recurring colitis unless he has some sort of underlying condition such as inflammatory bowel disease.  Of course these could all be sporadic but seems less likely given the review of his history and findings.  However he was told he did not have ulcerative colitis in the past.  He is improving clinically and he has been started on Flagyl.  2. Patient had a GI panel that was negative his C. difficile is pending but he is already improving.  3. Covid booster with rash improved      Plan:  · Continue antibiotics p.o.  · Follow-up on C. difficile  · Add budesonide  · Check IBD serologies  · Unsedated flex sig in a.m. for biopsies.  This was the result of a discussion had with the patient and his wife.  He has some logistical reasons for wanting to be discharged home from the hospital as soon as possible.  With an unsedated flex sig this will give him the most flexibility while allowing us to at least get some biopsies to look for ulcerative colitis as a diagnosis.  He does know he needs a full colonoscopy which will be planned as an outpatient or done sooner if his clinical course fails to continue to improve or worsens.       I discussed the patients findings and my recommendations with patient, family, nursing staff and consulting provider.           Barney Boyle M.D.  [facility] [place] [address]  Office: [phone]

## 2021-12-17 NOTE — ED NOTES
"Pt ambulatory to triage from home with c/o rash, stabbing pains in legs - states got covid booster earlier today.  Pt also c/o \"colitis-type\" abdominal cramping since shot.  Pt wearing mask in triage. Triage personnel wore appropriate PPE       Preeti Gabriel, RN  12/16/21 2972    "

## 2021-12-18 ENCOUNTER — ANESTHESIA (OUTPATIENT)
Dept: GASTROENTEROLOGY | Facility: HOSPITAL | Age: 41
End: 2021-12-18

## 2021-12-18 ENCOUNTER — ANESTHESIA EVENT (OUTPATIENT)
Dept: GASTROENTEROLOGY | Facility: HOSPITAL | Age: 41
End: 2021-12-18

## 2021-12-18 VITALS
HEART RATE: 54 BPM | SYSTOLIC BLOOD PRESSURE: 115 MMHG | RESPIRATION RATE: 16 BRPM | HEIGHT: 69 IN | BODY MASS INDEX: 26.23 KG/M2 | TEMPERATURE: 97.7 F | OXYGEN SATURATION: 99 % | WEIGHT: 177.1 LBS | DIASTOLIC BLOOD PRESSURE: 70 MMHG

## 2021-12-18 LAB
ANION GAP SERPL CALCULATED.3IONS-SCNC: 5 MMOL/L (ref 5–15)
BUN SERPL-MCNC: 9 MG/DL (ref 6–20)
BUN/CREAT SERPL: 10.1 (ref 7–25)
CALCIUM SPEC-SCNC: 8.8 MG/DL (ref 8.6–10.5)
CHLORIDE SERPL-SCNC: 105 MMOL/L (ref 98–107)
CO2 SERPL-SCNC: 27 MMOL/L (ref 22–29)
CREAT SERPL-MCNC: 0.89 MG/DL (ref 0.76–1.27)
DEPRECATED RDW RBC AUTO: 41.3 FL (ref 37–54)
ERYTHROCYTE [DISTWIDTH] IN BLOOD BY AUTOMATED COUNT: 12.7 % (ref 12.3–15.4)
GFR SERPL CREATININE-BSD FRML MDRD: 94 ML/MIN/1.73
GLUCOSE SERPL-MCNC: 90 MG/DL (ref 65–99)
HCT VFR BLD AUTO: 41.9 % (ref 37.5–51)
HGB BLD-MCNC: 14.4 G/DL (ref 13–17.7)
MCH RBC QN AUTO: 30.5 PG (ref 26.6–33)
MCHC RBC AUTO-ENTMCNC: 34.4 G/DL (ref 31.5–35.7)
MCV RBC AUTO: 88.8 FL (ref 79–97)
PLATELET # BLD AUTO: 233 10*3/MM3 (ref 140–450)
PMV BLD AUTO: 9.4 FL (ref 6–12)
POTASSIUM SERPL-SCNC: 3.7 MMOL/L (ref 3.5–5.2)
RBC # BLD AUTO: 4.72 10*6/MM3 (ref 4.14–5.8)
SODIUM SERPL-SCNC: 137 MMOL/L (ref 136–145)
WBC NRBC COR # BLD: 8.09 10*3/MM3 (ref 3.4–10.8)

## 2021-12-18 PROCEDURE — 80048 BASIC METABOLIC PNL TOTAL CA: CPT | Performed by: INTERNAL MEDICINE

## 2021-12-18 PROCEDURE — 45380 COLONOSCOPY AND BIOPSY: CPT | Performed by: INTERNAL MEDICINE

## 2021-12-18 PROCEDURE — 25010000002 PROPOFOL 10 MG/ML EMULSION: Performed by: ANESTHESIOLOGY

## 2021-12-18 PROCEDURE — 96361 HYDRATE IV INFUSION ADD-ON: CPT

## 2021-12-18 PROCEDURE — G0378 HOSPITAL OBSERVATION PER HR: HCPCS

## 2021-12-18 PROCEDURE — 96376 TX/PRO/DX INJ SAME DRUG ADON: CPT

## 2021-12-18 PROCEDURE — 85027 COMPLETE CBC AUTOMATED: CPT | Performed by: INTERNAL MEDICINE

## 2021-12-18 PROCEDURE — 88305 TISSUE EXAM BY PATHOLOGIST: CPT | Performed by: INTERNAL MEDICINE

## 2021-12-18 RX ORDER — BUDESONIDE 3 MG/1
9 CAPSULE, COATED PELLETS ORAL DAILY
Qty: 90 CAPSULE | Refills: 0 | Status: SHIPPED | OUTPATIENT
Start: 2021-12-19 | End: 2022-01-13

## 2021-12-18 RX ORDER — SODIUM CHLORIDE, SODIUM LACTATE, POTASSIUM CHLORIDE, CALCIUM CHLORIDE 600; 310; 30; 20 MG/100ML; MG/100ML; MG/100ML; MG/100ML
1000 INJECTION, SOLUTION INTRAVENOUS CONTINUOUS
Status: DISCONTINUED | OUTPATIENT
Start: 2021-12-18 | End: 2021-12-18 | Stop reason: HOSPADM

## 2021-12-18 RX ORDER — PROPOFOL 10 MG/ML
VIAL (ML) INTRAVENOUS CONTINUOUS PRN
Status: DISCONTINUED | OUTPATIENT
Start: 2021-12-18 | End: 2021-12-18 | Stop reason: SURG

## 2021-12-18 RX ORDER — ONDANSETRON 4 MG/1
4 TABLET, FILM COATED ORAL EVERY 8 HOURS PRN
Qty: 15 TABLET | Refills: 0 | Status: SHIPPED | OUTPATIENT
Start: 2021-12-18 | End: 2022-01-13

## 2021-12-18 RX ORDER — ACETAMINOPHEN 325 MG/1
650 TABLET ORAL EVERY 4 HOURS PRN
Start: 2021-12-18 | End: 2022-01-13

## 2021-12-18 RX ORDER — HYDROCODONE BITARTRATE AND ACETAMINOPHEN 5; 325 MG/1; MG/1
1 TABLET ORAL EVERY 6 HOURS PRN
Qty: 8 TABLET | Refills: 0 | Status: SHIPPED | OUTPATIENT
Start: 2021-12-18 | End: 2022-01-13

## 2021-12-18 RX ORDER — METRONIDAZOLE 500 MG/1
500 TABLET ORAL EVERY 8 HOURS SCHEDULED
Qty: 21 TABLET | Refills: 0 | Status: SHIPPED | OUTPATIENT
Start: 2021-12-18 | End: 2021-12-25

## 2021-12-18 RX ORDER — SODIUM CHLORIDE 0.9 % (FLUSH) 0.9 %
10 SYRINGE (ML) INJECTION AS NEEDED
Status: DISCONTINUED | OUTPATIENT
Start: 2021-12-18 | End: 2021-12-18 | Stop reason: HOSPADM

## 2021-12-18 RX ORDER — SODIUM CHLORIDE 9 MG/ML
1000 INJECTION, SOLUTION INTRAVENOUS CONTINUOUS
Status: DISCONTINUED | OUTPATIENT
Start: 2021-12-18 | End: 2021-12-18 | Stop reason: HOSPADM

## 2021-12-18 RX ADMIN — PANTOPRAZOLE SODIUM 40 MG: 40 INJECTION, POWDER, FOR SOLUTION INTRAVENOUS at 05:26

## 2021-12-18 RX ADMIN — SODIUM CHLORIDE, PRESERVATIVE FREE 10 ML: 5 INJECTION INTRAVENOUS at 09:31

## 2021-12-18 RX ADMIN — BUDESONIDE 9 MG: 3 CAPSULE ORAL at 09:31

## 2021-12-18 RX ADMIN — SODIUM CHLORIDE 100 ML/HR: 9 INJECTION, SOLUTION INTRAVENOUS at 07:37

## 2021-12-18 RX ADMIN — METRONIDAZOLE 500 MG: 500 TABLET, FILM COATED ORAL at 05:26

## 2021-12-18 RX ADMIN — PROPOFOL 50 MCG/KG/MIN: 10 INJECTION, EMULSION INTRAVENOUS at 08:11

## 2021-12-18 NOTE — ANESTHESIA PREPROCEDURE EVALUATION
Anesthesia Evaluation     Patient summary reviewed and Nursing notes reviewed                Airway   Mallampati: II  TM distance: >3 FB  Neck ROM: full  Dental      Pulmonary - negative pulmonary ROS   Cardiovascular - negative cardio ROS    Rhythm: regular  Rate: normal        Neuro/Psych- negative ROS  GI/Hepatic/Renal/Endo    (+)  GI bleeding ,     Musculoskeletal (-) negative ROS    Abdominal    Substance History - negative use     OB/GYN negative ob/gyn ROS         Other                      Anesthesia Plan    ASA 1     MAC   (I have reviewed the patient's history with the patient and the chart, including all pertinent laboratory results and imaging. I have explained the risks of anesthesia including but not limited to dental damage, corneal abrasion, nerve injury, MI, stroke, and death. Questions asked and answered. Anesthetic plan discussed with patient and team as indicated. Patient expressed understanding of the above.  )  intravenous induction     Anesthetic plan, all risks, benefits, and alternatives have been provided, discussed and informed consent has been obtained with: patient.

## 2021-12-18 NOTE — ANESTHESIA POSTPROCEDURE EVALUATION
Patient: Ole Mackay    Procedure Summary     Date: 12/18/21 Room / Location:  [facility]    Anesthesia Start: 0810 Anesthesia Stop: 0840    Procedure: COLONOSCOPY to cecum with random cold biopsies (N/A ) Diagnosis:       Change in bowel movement      (Change in bowel movement [R19.8])    Surgeons: Omi Cabello MD Provider:     Anesthesia Type: MAC ASA Status: 1          Anesthesia Type: MAC    Vitals  No vitals data found for the desired time range.          Post Anesthesia Care and Evaluation    Patient location during evaluation: PACU  Patient participation: complete - patient participated  Level of consciousness: awake and alert  Pain management: adequate  Airway patency: patent  Anesthetic complications: No anesthetic complications    Cardiovascular status: acceptable  Respiratory status: acceptable  Hydration status: acceptable    Comments: --------------------            12/18/21               0731     --------------------   BP:       136/95     Pulse:      65       Resp:       14       Temp:                SpO2:      98%      --------------------

## 2021-12-18 NOTE — DISCHARGE SUMMARY
NAME: Ole Mackay ADMIT: 2021   : 1980  PCP: Provider, No Known    MRN: [id number] LOS: 0 days   AGE/SEX: 41 y.o. male  ROOM: ENDO/ENDO     Date of Admission:  2021  Date of Discharge:  2021    PCP: Provider, No Known    CHIEF COMPLAINT  Rash and Lower Extremity Issue      DISCHARGE DIAGNOSIS  Active Hospital Problems    Diagnosis  POA   • **Colitis [K52.9]  Yes   • Rectal bleeding [K62.5]  Yes   • Change in bowel movement [R19.8]  Yes      Resolved Hospital Problems   No resolved problems to display.       SECONDARY DIAGNOSES  History reviewed. No pertinent past medical history.    CONSULTS   GI    HOSPITAL COURSE  Patient is a 41 y.o. male presented to [facility] complaining of abdominal pain and recurrent bouts of colitis over the past 10 years..  Please see the admitting history and physical for further details.      He was admitted to the hospital and given fluids, bowel rest, Flagyl.  Stool PCR and stool C. difficile were negative.  Seen by GI as we do suspect he likely has underlying inflammatory bowel disease.  He had unprepped colonoscopy on  with evidence of colitis and areas were biopsied.  He will be discharged on Entocort. Follow up with Dr. Boyle in a couple of weeks.  Did prescribe a course of Flagyl as well as low-dose as needed Norco for moderate pain, though encouraged him to take Tylenol if pain is just mild.  Also prescribe Zofran as needed.    DIAGNOSTICS    C scope   - Preparation of the colon was poor.  - Diverticulosis in the sigmoid colon and in the descending colon.  - Friable (with contact bleeding), inflamed and vascular-pattern-decreased mucosa in the sigmoid colon and in  the descending colon. Biopsied.  - The examined portion of the ileum was normal.  - Stool in the entire examined colon.  Impression:  - Discharge patient to home. Continue Entocort. Follow up with Dr. Boyle in a couple of weeks.  - Await pathology  results.  - Return patient to [facility] for possible discharge same day.      12/18/2021 0716 12/18/2021 0736 CBC (No Diff) [[id number]]   Blood    Final result Component Value Units   WBC 8.09 10*3/mm3   RBC 4.72 10*6/mm3   Hemoglobin 14.4 g/dL   Hematocrit 41.9 %   MCV 88.8 fL   MCH 30.5 pg   MCHC 34.4 g/dL   RDW 12.7 %   RDW-SD 41.3 fl   MPV 9.4 fL   Platelets 233 10*3/mm3           12/18/2021 0716 12/18/2021 0810 Basic Metabolic Panel [[id number]]    Blood    Final result Component Value Units   Glucose 90 mg/dL   BUN 9 mg/dL   Creatinine 0.89 mg/dL   Sodium 137 mmol/L   Potassium 3.7 mmol/L   Chloride 105 mmol/L   CO2 27.0 mmol/L   Calcium 8.8 mg/dL   eGFR Non African Amer 94 mL/min/1.73   BUN/Creatinine Ratio 10.1    Anion Gap 5.0 mmol/L           12/17/2021 1837 12/17/2021 2014 Clostridium Difficile Toxin - Stool, Per Rectum [[id number]]    Stool from Per Rectum    Final result Component Value   No component results              12/17/2021 1837 12/17/2021 2014 Clostridium Difficile Toxin, PCR - Stool, Per Rectum [[id number]]   Stool from Per Rectum    Final result Component Value   C. Difficile Toxins by PCR Negative              12/17/2021 1757 12/17/2021 1804 IBD Sgi Diagnostic [[id number]]   Blood    In process Component Value   No component results           12/17/2021 0706 12/17/2021 0746 Basic Metabolic Panel [[id number]]    (Abnormal)   Blood    Final result Component Value Units   Glucose 118 High  mg/dL   BUN 14 mg/dL   Creatinine 1.01 mg/dL   Sodium 137 mmol/L   Potassium 3.8 mmol/L   Chloride 103 mmol/L   CO2 26.6 mmol/L   Calcium 8.6 mg/dL   eGFR Non African Amer 81 mL/min/1.73   BUN/Creatinine Ratio 13.9    Anion Gap 7.4 mmol/L           12/17/2021 0706 12/17/2021 0741 CBC Auto Differential [[id number]]   (Abnormal)   Blood    Final result Component Value Units   WBC 13.74 High  10*3/mm3   RBC 4.88 10*6/mm3   Hemoglobin 14.7 g/dL   Hematocrit 43.4 %   MCV 88.9 fL   MCH 30.1 pg   MCHC 33.9 g/dL   RDW  12.9 %   RDW-SD 42.3 fl   MPV 9.3 fL   Platelets 263 10*3/mm3   Neutrophil % 75.2 %   Lymphocyte % 14.3 Low  %   Monocyte % 8.0 %   Eosinophil % 1.8 %   Basophil % 0.3 %   Immature Grans % 0.4 %   Neutrophils, Absolute 10.33 High  10*3/mm3   Lymphocytes, Absolute 1.97 10*3/mm3   Monocytes, Absolute 1.10 High  10*3/mm3   Eosinophils, Absolute 0.25 10*3/mm3   Basophils, Absolute 0.04 10*3/mm3   Immature Grans, Absolute 0.05 10*3/mm3   nRBC 0.0 /100 WBC           12/17/2021 0706 12/17/2021 0741 Lactic Acid, Plasma [[id number]]   Blood    Final result Component Value Units   Lactate 0.8 mmol/L           12/17/2021 0429 12/17/2021 0735 Gastrointestinal Panel, PCR - Stool, Per Rectum [[id number]]    Stool from Per Rectum    Final result Component Value   Campylobacter Not Detected   Plesiomonas shigelloides Not Detected   Salmonella Not Detected   Vibrio Not Detected   Vibrio cholerae Not Detected   Yersinia enterocolitica Not Detected   Enteroaggregative E. coli (EAEC) Not Detected   Enteropathogenic E. coli (EPEC) Not Detected   Enterotoxigenic E. coli (ETEC) lt/st Not Detected   Shiga-like toxin-producing E. coli (STEC) stx1/stx2 Not Detected   Shigella/Enteroinvasive E. coli (EIEC) Not Detected   Cryptosporidium Not Detected   Cyclospora cayetanensis Not Detected   Entamoeba histolytica Not Detected   Giardia lamblia Not Detected   Adenovirus F40/41 Not Detected   Astrovirus Not Detected   Norovirus GI/GII Not Detected   Rotavirus A Not Detected   Sapovirus (I, II, IV or V) Not Detected              12/17/2021 0429 12/17/2021 0617 Occult Blood X 1, Stool - Stool, Per Rectum [[id number]]   (Abnormal)   Stool from Per Rectum    Final result Component Value   Fecal Occult Blood Positive Abnormal            12/17/2021 0151 12/17/2021 1219 COVID PRE-OP / PRE-PROCEDURE SCREENING ORDER (NO ISOLATION) - Swab, Nasopharynx [[id number]]    Swab from Nasopharynx    Final result Component Value   No component results               12/17/2021 0151 12/17/2021 1219 COVID-19,APTIMA PANTHER(GRANT),[facility], NP/OP SWAB IN UTM/VTM/SALINE TRANSPORT MEDIA,24 HR TAT - Swab, Nasopharynx [[id number]]    Swab from Nasopharynx    Final result Component Value   COVID19 Not Detected              12/17/2021 0029 12/17/2021 0107 Urinalysis With Microscopic If Indicated (No Culture) - Urine, Clean Catch [[id number]]    Urine, Clean Catch    Final result Component Value   Color, UA Yellow   Appearance, UA Clear   pH, UA 6.5   Specific Gravity, UA <=1.005   Glucose, UA Negative   Ketones, UA Negative   Bilirubin, UA Negative   Blood, UA Negative   Protein, UA Negative   Leuk Esterase, UA Negative   Nitrite, UA Negative   Urobilinogen, UA 0.2 E.U./dL           12/16/2021 2342 12/17/2021 0020 Comprehensive Metabolic Panel [[id number]]    (Abnormal)   Blood    Final result Component Value Units   Glucose 102 High  mg/dL   BUN 20 mg/dL   Creatinine 1.12 mg/dL   Sodium 142 mmol/L   Potassium 3.9 mmol/L   Chloride 103 mmol/L   CO2 29.2 High  mmol/L   Calcium 9.5 mg/dL   Total Protein 7.0 g/dL   Albumin 4.20 g/dL   ALT (SGPT) 32 U/L   AST (SGOT) 18 U/L   Alkaline Phosphatase 21 Low  U/L   Total Bilirubin 0.5 mg/dL   eGFR Non African Amer 72 mL/min/1.73   Globulin 2.8 gm/dL   A/G Ratio 1.5 g/dL   BUN/Creatinine Ratio 17.9    Anion Gap 9.8 mmol/L           12/16/2021 2342 12/17/2021 0020 Lipase [[id number]]   (Abnormal)   Blood    Final result Component Value Units   Lipase 11 Low  U/L           12/16/2021 2342 12/17/2021 0019 CBC Auto Differential [[id number]]   (Abnormal)   Blood    Final result Component Value Units   WBC 19.55 High  10*3/mm3   RBC 5.69 10*6/mm3   Hemoglobin 17.0 g/dL   Hematocrit 51.2 High  %   MCV 90.0 fL   MCH 29.9 pg   MCHC 33.2 g/dL   RDW 13.1 %   RDW-SD 42.7 fl   MPV 9.4 fL   Platelets 293 10*3/mm3   Neutrophil % 83.0 High  %   Lymphocyte % 8.1 Low  %   Monocyte % 7.5 %   Eosinophil % 0.4 %   Basophil % 0.2 %   Immature Grans % 0.8 High  %    Neutrophils, Absolute 16.25 High  10*3/mm3   Lymphocytes, Absolute 1.58 10*3/mm3              PHYSICAL EXAM  Objective    Alert  nad  No resp distress  Pleasant, wishing for discharge    CONDITION ON DISCHARGE  Stable.      DISCHARGE DISPOSITION   Home or Self Care      DISCHARGE MEDICATIONS       Your medication list      START taking these medications      Instructions Last Dose Given Next Dose Due   acetaminophen 325 MG tablet  Commonly known as: TYLENOL      Take 2 tablets by mouth Every 4 (Four) Hours As Needed for Mild Pain .       Budesonide 3 MG 24 hr capsule  Commonly known as: ENTOCORT EC  Start taking on: December 19, 2021      Take 3 capsules by mouth Daily.       HYDROcodone-acetaminophen 5-325 MG per tablet  Commonly known as: Norco      Take 1 tablet by mouth Every 6 (Six) Hours As Needed for Moderate Pain .       metroNIDAZOLE 500 MG tablet  Commonly known as: FLAGYL      Take 1 tablet by mouth Every 8 (Eight) Hours for 7 days. Indications: Infection Within the Abdomen       ondansetron 4 MG tablet  Commonly known as: Zofran      Take 1 tablet by mouth Every 8 (Eight) Hours As Needed for Nausea or Vomiting.             Where to Get Your Medications      These medications were sent to [address] - [address] [place] [address] AT [facility] [address] & (ANGELA A - [phone]  - [phone]   [address] [place] [address]    Phone: [phone]   · Budesonide 3 MG 24 hr capsule  · HYDROcodone-acetaminophen 5-325 MG per tablet  · metroNIDAZOLE 500 MG tablet  · ondansetron 4 MG tablet     Information about where to get these medications is not yet available    Ask your nurse or doctor about these medications  · acetaminophen 325 MG tablet        No future appointments.   Follow-up Information     Provider, No Known .    Contact information:  [facility] [address] [phone]                         TEST  RESULTS PENDING AT DISCHARGE  Pending Labs      Order Current Status    Tissue Pathology Exam Collected (12/18/21 9933)    IBD Sgi Diagnostic In process             Rogelio Marley MD  [facility]  12/18/21  10:11 EST    It was a pleasure taking care of this patient while in the hospital.

## 2021-12-18 NOTE — PLAN OF CARE
Goal Outcome Evaluation:  Plan of Care Reviewed With: patient        Progress: improving  Outcome Summary: vss, reports less abdominal cramping, abd soft with bsp, denies nausea, reports adequate pain control with po analgesic, refused bowel prep, doctor notified, plan is for unsedated flexible sigmoidoscopy today and then pt wants to go home, voiding well, stools negative for c-diff

## 2021-12-18 NOTE — PLAN OF CARE
Goal Outcome Evaluation:              Outcome Summary: Colitis, A&O, VSS, RA, up ad ainsley, PO flagyl, procedure scheduled for tomorrow, Norco for pain, CTM

## 2021-12-20 NOTE — CASE MANAGEMENT/SOCIAL WORK
Case Management Discharge Note      Final Note: Home.         Selected Continued Care - Discharged on 12/18/2021 Admission date: 12/16/2021 - Discharge disposition: Home or Self Care    Destination    No services have been selected for the patient.              Durable Medical Equipment    No services have been selected for the patient.              Dialysis/Infusion    No services have been selected for the patient.              Home Medical Care    No services have been selected for the patient.              Therapy    No services have been selected for the patient.              Community Resources    No services have been selected for the patient.              Community & DME    No services have been selected for the patient.                       Final Discharge Disposition Code: 01 - home or self-care

## 2021-12-21 LAB
LAB AP CASE REPORT: NORMAL
LAB AP DIAGNOSIS COMMENT: NORMAL
PATH REPORT.FINAL DX SPEC: NORMAL
PATH REPORT.GROSS SPEC: NORMAL

## 2022-01-02 LAB — REF LAB TEST METHOD: NORMAL

## 2022-01-13 ENCOUNTER — OFFICE VISIT (OUTPATIENT)
Dept: GASTROENTEROLOGY | Facility: CLINIC | Age: 42
End: 2022-01-13

## 2022-01-13 VITALS
WEIGHT: 176 LBS | DIASTOLIC BLOOD PRESSURE: 70 MMHG | TEMPERATURE: 98.4 F | OXYGEN SATURATION: 99 % | HEIGHT: 69 IN | BODY MASS INDEX: 26.07 KG/M2 | HEART RATE: 77 BPM | SYSTOLIC BLOOD PRESSURE: 106 MMHG

## 2022-01-13 DIAGNOSIS — K59.04 CHRONIC IDIOPATHIC CONSTIPATION: ICD-10-CM

## 2022-01-13 DIAGNOSIS — K55.9 ISCHEMIC COLITIS: Primary | ICD-10-CM

## 2022-01-13 PROCEDURE — 99214 OFFICE O/P EST MOD 30 MIN: CPT | Performed by: NURSE PRACTITIONER

## 2022-01-13 RX ORDER — HYDROXYZINE PAMOATE 25 MG/1
25 CAPSULE ORAL
COMMUNITY
Start: 2021-08-13

## 2022-01-13 NOTE — PROGRESS NOTES
Chief Complaint   Patient presents with   • Hospital Follow Up Visit     patient thinks it may be the colitis         History of Present Illness  41-year-old male presents today for follow-up after hospital admission December 16 through December 18, 2021.  Patient admitted with abdominal pain.  He has a history of recurrent bouts of colitis over the past 10 years.Given findings of colitis on unprepped colonoscopy December 18, 2021, patient was started on Entocort and presents today for follow-up.    He has constipation and can go up to three days without a bowel movement at times. No rectal bleeding except for during episodes of colitis.  He has intermittent symptoms of colitis on and off that he will try to manage at home.  His last hospitalization before this most recent 1 was approximately 3 years ago.    He has weekly symptoms of nausea and cramping. Treated with walking, peppermint oil and mariajuana.     He has found that specific food worsen symptoms.    Stool studies were negative during hospitalization, IBD SGI diagnostic panel performed, pattern not consistent with inflammatory bowel disease.  Biopsies from the descending and sigmoid colon suggest ischemic changes, no changes of chronic inflammatory bowel disease present.  Rectal biopsies suggesting pseudomembrane, which can be associated with infectious etiology.    Patient denies extreme exercise.      December 18, 2021 colonoscopy.  Large amount of solid stool found in the entire colon interfering with visualization.  Segmental area of moderately friable inflamed and vascular pattern decreased mucosa in the sigmoid and descending colon extending from 45 cm down to about 20 cm.    Result Review :       Tissue Pathology Exam (12/18/2021 08:28)   COLONOSCOPY (12/18/2021 08:08)   Gastrointestinal Panel, PCR - Stool, Per Rectum (12/17/2021 04:29)   Clostridium Difficile Toxin - Stool, Per Rectum (12/17/2021 18:37)   IBD Sgi Diagnostic (12/17/2021 17:57)  "    Vital Signs:   /70   Pulse 77   Temp 98.4 °F (36.9 °C)   Ht 175.3 cm (69\")   Wt 79.8 kg (176 lb)   SpO2 99%   BMI 25.99 kg/m²     Body mass index is 25.99 kg/m².     Physical Exam  Vitals reviewed.   Constitutional:       General: He is not in acute distress.     Appearance: Normal appearance. He is well-developed and normal weight. He is not ill-appearing, toxic-appearing or diaphoretic.   Pulmonary:      Effort: No respiratory distress.   Skin:     Coloration: Skin is not pale.   Neurological:      Mental Status: He is alert.           Assessment and Plan    Diagnoses and all orders for this visit:    1. Ischemic colitis (HCC) (Primary)  -     Ambulatory Referral to Hematology  -     CT Angiogram Abdomen With & Without Contrast; Future    2. Chronic idiopathic constipation     Reviewed recent hospitalization including discharge summary, operative report, biopsy results, stool studies, blood work.  Biopsy results support ischemic changes.    Given recent ischemic colitis and history of colitis, it is imperative that patient avoid dehydration, constipation, overly aggressive treatment for hypertension and excessive exercise.     Given his age and recurrent episodes of colitis, recommend hematology evaluation to look for underlying hypercoagulability.  Referral to hematology has been placed and order for CTA.     Would like to aggressively treat constipation, patient prefers natural treatments at this time and will start fiber supplement and increase dietary fiber.  Discussed symptoms of ischemic colitis versus symptoms of constipation and functional GI symptoms.  Would like to regulate bowel movements and proceed with evaluation for ongoing repeated episodes of ischemic colitis, orders placed.    Recommend patient follow-up with our office after hematology and CTA has been performed.    Patient Instructions   Recommend daily fiber supplement    Orders placed for hematology referral for " hypercoagulability work up    Proceed with CTA scan for further evaluation, orders placed.     Avoid excessive excersice, dehydration, constipation and excessive treatment for hypertension in the future.     Further recommendations will be made pending the results of the above work up and clinical course.             EMR Dragon/Transcription Disclaimer:  This document has been Dictated utilizing Dragon dictation.

## 2022-01-13 NOTE — PATIENT INSTRUCTIONS
Recommend daily fiber supplement    Orders placed for hematology referral for hypercoagulability work up    Proceed with CTA scan for further evaluation, orders placed.     Avoid excessive excersice, dehydration, constipation and excessive treatment for hypertension in the future.     Further recommendations will be made pending the results of the above work up and clinical course.

## 2022-01-20 ENCOUNTER — CONSULT (OUTPATIENT)
Dept: ONCOLOGY | Facility: CLINIC | Age: 42
End: 2022-01-20

## 2022-01-20 ENCOUNTER — LAB (OUTPATIENT)
Dept: LAB | Facility: HOSPITAL | Age: 42
End: 2022-01-20

## 2022-01-20 VITALS
SYSTOLIC BLOOD PRESSURE: 95 MMHG | WEIGHT: 177.5 LBS | DIASTOLIC BLOOD PRESSURE: 60 MMHG | HEIGHT: 69 IN | HEART RATE: 62 BPM | TEMPERATURE: 97.1 F | OXYGEN SATURATION: 100 % | RESPIRATION RATE: 16 BRPM | BODY MASS INDEX: 26.29 KG/M2

## 2022-01-20 DIAGNOSIS — R79.89 ABNORMAL CBC: Primary | ICD-10-CM

## 2022-01-20 DIAGNOSIS — K55.9 ISCHEMIC COLITIS: Primary | ICD-10-CM

## 2022-01-20 LAB
BASOPHILS # BLD AUTO: 0.11 10*3/MM3 (ref 0–0.2)
BASOPHILS NFR BLD AUTO: 1.1 % (ref 0–1.5)
DEPRECATED RDW RBC AUTO: 38.9 FL (ref 37–54)
EOSINOPHIL # BLD AUTO: 0.49 10*3/MM3 (ref 0–0.4)
EOSINOPHIL NFR BLD AUTO: 5 % (ref 0.3–6.2)
ERYTHROCYTE [DISTWIDTH] IN BLOOD BY AUTOMATED COUNT: 12.3 % (ref 12.3–15.4)
HCT VFR BLD AUTO: 47.6 % (ref 37.5–51)
HGB BLD-MCNC: 16.4 G/DL (ref 13–17.7)
IMM GRANULOCYTES # BLD AUTO: 0.19 10*3/MM3 (ref 0–0.05)
IMM GRANULOCYTES NFR BLD AUTO: 1.9 % (ref 0–0.5)
LYMPHOCYTES # BLD AUTO: 2.68 10*3/MM3 (ref 0.7–3.1)
LYMPHOCYTES NFR BLD AUTO: 27.2 % (ref 19.6–45.3)
MCH RBC QN AUTO: 29.7 PG (ref 26.6–33)
MCHC RBC AUTO-ENTMCNC: 34.5 G/DL (ref 31.5–35.7)
MCV RBC AUTO: 86.1 FL (ref 79–97)
MONOCYTES # BLD AUTO: 0.6 10*3/MM3 (ref 0.1–0.9)
MONOCYTES NFR BLD AUTO: 6.1 % (ref 5–12)
NEUTROPHILS NFR BLD AUTO: 5.78 10*3/MM3 (ref 1.7–7)
NEUTROPHILS NFR BLD AUTO: 58.7 % (ref 42.7–76)
NRBC BLD AUTO-RTO: 0.2 /100 WBC (ref 0–0.2)
PLATELET # BLD AUTO: 282 10*3/MM3 (ref 140–450)
PMV BLD AUTO: 9.6 FL (ref 6–12)
RBC # BLD AUTO: 5.53 10*6/MM3 (ref 4.14–5.8)
WBC NRBC COR # BLD: 9.85 10*3/MM3 (ref 3.4–10.8)

## 2022-01-20 PROCEDURE — 36415 COLL VENOUS BLD VENIPUNCTURE: CPT

## 2022-01-20 PROCEDURE — 85025 COMPLETE CBC W/AUTO DIFF WBC: CPT

## 2022-01-20 PROCEDURE — 99205 OFFICE O/P NEW HI 60 MIN: CPT | Performed by: INTERNAL MEDICINE

## 2022-01-20 NOTE — PROGRESS NOTES
Subjective Discussed patient's findings to date    REASON FOR CONSULTATION: Potential ischemic colitis  Provide an opinion on any further workup or treatment                             REQUESTING PHYSICIAN: ALHAJI Pina    RECORDS OBTAINED:  Records of the patients history including those obtained from the referring provider were reviewed and summarized in detail.    HISTORY OF PRESENT ILLNESS:  The patient is a 41 y.o. year old male who is here for an opinion about the above issue.    History of Present Illness   The patient is a 41-year-old male who recently been admitted to [facility] 12/16-18/2021.  He had been having episodes of abdominal pain and bouts of colitis over the previous 10 years-apparent infectious colitis with 4 episodes over that period of time.  Just prior to admission he had episodes of watery diarrhea and rectal bleeding, generalized abdominal pain.  He did receive his COVID-19 booster just prior to this.  CT scan of the abdomen demonstrated colitis extending from splenic flexure to the rectum.  Seen by GI medicine and felt to have ischemic changes without evidence of inflammatory bowel disease.     The patient has been referred for potential hypercoagulable state and discussed with the patient our referenced studies that have been found to include abnormalities of protein S and C abnormalities, fibrinogen, MTHFR, lupus anticoagulant, factor V Leiden, prothrombin gene mutation, ERVIN polymorphisms as potential possibilities.     The patient himself has never had any additional suggestion of thrombosis nor his family members except for perhaps his father who had a previous leg injury.  Several family members have had GI abnormalities for years including diverticulitis and possibly IBS.  Past Medical History:   Diagnosis Date   • Anxiety    • Colitis    • Hyperlipidemia         Past Surgical History:   Procedure Laterality Date   • COLONOSCOPY N/A 12/18/2021    Procedure: COLONOSCOPY to cecum  "with random cold biopsies;  Surgeon: Omi Cabello MD;  Location: [facility] ENDOSCOPY;  Service: Gastroenterology;  Laterality: N/A;  pre: colitis, diarhhea, abd. pain  post:  colitis, diverticulosis   • ENDOSCOPY          Current Outpatient Medications on File Prior to Visit   Medication Sig Dispense Refill   • hydrOXYzine pamoate (VISTARIL) 25 MG capsule Take 25 mg by mouth.       No current facility-administered medications on file prior to visit.        ALLERGIES:  No Known Allergies     Social History     Socioeconomic History   • Marital status:      Spouse name: Christine   Tobacco Use   • Smoking status: Former Smoker     Packs/day: 0.50     Types: Cigarettes   • Smokeless tobacco: Never Used   Vaping Use   • Vaping Use: Never used   Substance and Sexual Activity   • Alcohol use: Yes     Comment: socially   • Drug use: Yes     Types: Marijuana   • Sexual activity: Defer        Family History   Family history unknown: Yes        Review of Systems   Constitutional: Positive for activity change and fatigue.   HENT: Negative.    Eyes: Negative.    Respiratory: Negative.    Cardiovascular: Negative.    Gastrointestinal: Positive for abdominal distention, blood in stool, constipation, diarrhea and rectal pain.   Endocrine: Negative.    Genitourinary: Negative.    Musculoskeletal: Negative.    Skin: Negative.    Allergic/Immunologic: Negative.    Neurological: Negative.    Hematological: Negative.    Psychiatric/Behavioral: Negative.      Objective     Vitals:    01/20/22 1618   BP: 95/60   Pulse: 62   Resp: 16   Temp: 97.1 °F (36.2 °C)   TempSrc: Temporal   SpO2: 100%   Weight: 80.5 kg (177 lb 8 oz)   Height: 175.3 cm (69.02\")   PainSc: 0-No pain     Current Status 1/20/2022   ECOG score 0       Physical Exam  Constitutional:       Appearance: Normal appearance. He is normal weight.   HENT:      Head: Normocephalic and atraumatic.      Nose: Nose normal.      Mouth/Throat:      Mouth: Mucous membranes are " moist.      Pharynx: Oropharynx is clear.   Eyes:      Extraocular Movements: Extraocular movements intact.      Conjunctiva/sclera: Conjunctivae normal.      Pupils: Pupils are equal, round, and reactive to light.   Cardiovascular:      Rate and Rhythm: Normal rate and regular rhythm.      Pulses: Normal pulses.      Heart sounds: Normal heart sounds.   Pulmonary:      Effort: Pulmonary effort is normal.      Breath sounds: Normal breath sounds.   Abdominal:      General: Bowel sounds are normal.      Palpations: Abdomen is soft.   Musculoskeletal:         General: Normal range of motion.      Cervical back: Normal range of motion and neck supple.   Skin:     General: Skin is warm and dry.   Neurological:      General: No focal deficit present.      Mental Status: He is alert and oriented to person, place, and time.   Psychiatric:         Mood and Affect: Mood normal.         Behavior: Behavior normal.         RECENT LABS:  Hematology WBC   Date Value Ref Range Status   01/20/2022 9.85 3.40 - 10.80 10*3/mm3 Final   07/28/2021 13.96 (H) 4.5 - 11.0 10*3/uL Final     RBC   Date Value Ref Range Status   01/20/2022 5.53 4.14 - 5.80 10*6/mm3 Final   07/28/2021 5.69 4.5 - 5.9 10*6/uL Final     Hemoglobin   Date Value Ref Range Status   01/20/2022 16.4 13.0 - 17.7 g/dL Final   07/28/2021 16.9 13.5 - 17.5 g/dL Final     Hematocrit   Date Value Ref Range Status   01/20/2022 47.6 37.5 - 51.0 % Final   07/28/2021 52.9 41.0 - 53.0 % Final     Platelets   Date Value Ref Range Status   01/20/2022 282 140 - 450 10*3/mm3 Final   07/28/2021 302 140 - 440 10*3/uL Final          Assessment/Plan      41-year-old male with a prolonged history perhaps 9 to 10 years of abdominal pain and bouts of colitis.  He has been previously assessed including GI medicine with colonoscopy and endoscopy in 2013 and negative findings but has had progression of these episodes on several occasions that lead to abdominal pain, rectal bleeding to the point  "that he has to \"rest his gut\" until it passes.  He had recent admission 12/16-18 with the same process with initial scan showing colitis extending from splenic flexure to rectum and unprepped colonoscopy 12/18 with evidence of colitis.  His biopsies suggested possible early ischemic colitis.     He is referred for this possibility and now seen at [facility] office.  We discussed assessments for hypercoagulability but we will process including anticardiolipin antibodies, beta-2 glycoprotein antibodies, factor V Leiden, Factor VIII activity, prothrombin gene mutation, homocystine level, LDH, lupus anticoagulant, MTHFR mutation, plasminogen activator inhibitor and proteins C&S.     The patient is also scheduled for CT angiography of abdomen 1/13/2021.    Plan:  *Patient will undergo laboratory studies drawn in our office    *Additional CT of abdomen 1/13/2022    *Follow-up 3 to 5 weeks.    *Patient we will this plan and follow-up             "

## 2022-01-21 ENCOUNTER — LAB (OUTPATIENT)
Dept: LAB | Facility: HOSPITAL | Age: 42
End: 2022-01-21

## 2022-01-21 ENCOUNTER — TELEPHONE (OUTPATIENT)
Dept: ONCOLOGY | Facility: CLINIC | Age: 42
End: 2022-01-21

## 2022-01-21 LAB
ALBUMIN SERPL-MCNC: 4.8 G/DL (ref 3.5–5.2)
ALBUMIN/GLOB SERPL: 1.7 G/DL (ref 1.1–2.4)
ALP SERPL-CCNC: 19 U/L (ref 38–116)
ALT SERPL W P-5'-P-CCNC: 27 U/L (ref 0–41)
ANION GAP SERPL CALCULATED.3IONS-SCNC: 11.1 MMOL/L (ref 5–15)
AST SERPL-CCNC: 22 U/L (ref 0–40)
BILIRUB SERPL-MCNC: 0.3 MG/DL (ref 0.2–1.2)
BUN SERPL-MCNC: 12 MG/DL (ref 6–20)
BUN/CREAT SERPL: 9.6 (ref 7.3–30)
CALCIUM SPEC-SCNC: 9.9 MG/DL (ref 8.5–10.2)
CHLORIDE SERPL-SCNC: 101 MMOL/L (ref 98–107)
CO2 SERPL-SCNC: 28.9 MMOL/L (ref 22–29)
CREAT SERPL-MCNC: 1.25 MG/DL (ref 0.7–1.3)
GFR SERPL CREATININE-BSD FRML MDRD: 63 ML/MIN/1.73
GLOBULIN UR ELPH-MCNC: 2.8 GM/DL (ref 1.8–3.5)
GLUCOSE SERPL-MCNC: 116 MG/DL (ref 74–124)
HCYS SERPL-MCNC: 29.5 UMOL/L (ref 0–15)
LDH SERPL-CCNC: 181 U/L (ref 99–259)
POTASSIUM SERPL-SCNC: 4.2 MMOL/L (ref 3.5–4.7)
PROT SERPL-MCNC: 7.6 G/DL (ref 6.3–8)
SODIUM SERPL-SCNC: 141 MMOL/L (ref 134–145)

## 2022-01-21 PROCEDURE — 83090 ASSAY OF HOMOCYSTEINE: CPT | Performed by: INTERNAL MEDICINE

## 2022-01-21 PROCEDURE — 83615 LACTATE (LD) (LDH) ENZYME: CPT | Performed by: INTERNAL MEDICINE

## 2022-01-21 PROCEDURE — 85306 CLOT INHIBIT PROT S FREE: CPT | Performed by: INTERNAL MEDICINE

## 2022-01-21 PROCEDURE — 85303 CLOT INHIBIT PROT C ACTIVITY: CPT | Performed by: INTERNAL MEDICINE

## 2022-01-21 PROCEDURE — 85240 CLOT FACTOR VIII AHG 1 STAGE: CPT | Performed by: INTERNAL MEDICINE

## 2022-01-21 PROCEDURE — 80053 COMPREHEN METABOLIC PANEL: CPT | Performed by: INTERNAL MEDICINE

## 2022-01-21 NOTE — TELEPHONE ENCOUNTER
D/W Dr. Trejo. He does not want to pursue genetic counseling at this time. No need to have MTHFR testing done.   ----- Message from Monica To sent at 1/21/2022  2:02 PM EST -----  Lab requested a prior auth for lab test MTHFR. [facility] will not approve this test until patient has genetic counseling. Is this something you would want to set the patient up for so we can get this lab approved?

## 2022-01-22 LAB
CARDIOLIPIN IGG SER IA-ACNC: <9 GPL U/ML (ref 0–14)
CARDIOLIPIN IGM SER IA-ACNC: <9 MPL U/ML (ref 0–12)
LA 2 SCREEN W REFLEX-IMP: NORMAL
SCREEN APTT: 37.7 SEC (ref 0–51.9)
SCREEN DRVVT: 41.3 SEC (ref 0–47)

## 2022-01-23 LAB
B2 GLYCOPROT1 IGA SER-ACNC: <9 GPI IGA UNITS (ref 0–25)
B2 GLYCOPROT1 IGG SER-ACNC: <9 GPI IGG UNITS (ref 0–20)
B2 GLYCOPROT1 IGM SER-ACNC: <9 GPI IGM UNITS (ref 0–32)

## 2022-01-25 LAB
FACT VIII ACT/NOR PPP: 105 % ACTIVITY (ref 60–150)
Lab: NORMAL
PROT C ACT/NOR PPP: 154 % (ref 86–163)
PROT S ACT/NOR PPP: 127 % (ref 70–127)
PROT S FREE PPP-ACNC: 124 % (ref 49–138)

## 2022-01-28 LAB
INTERPRETATION: NORMAL
LABORATORY COMMENT REPORT: NORMAL
PAI-1 LOCUS 4G/5G POLYMORPHISM: NORMAL
SERPINE1 C.-675 4G+5G BLD/T QL: NORMAL

## 2022-02-07 ENCOUNTER — HOSPITAL ENCOUNTER (OUTPATIENT)
Dept: CT IMAGING | Facility: HOSPITAL | Age: 42
Discharge: HOME | End: 2022-02-07
Admitting: NURSE PRACTITIONER

## 2022-02-07 DIAGNOSIS — K55.9 ISCHEMIC COLITIS: ICD-10-CM

## 2022-02-07 PROCEDURE — 74175 CTA ABDOMEN W/CONTRAST: CPT

## 2022-02-07 PROCEDURE — 0 IOPAMIDOL PER 1 ML: Performed by: NURSE PRACTITIONER

## 2022-02-07 RX ADMIN — IOPAMIDOL 100 ML: 755 INJECTION, SOLUTION INTRAVENOUS at 06:37

## 2022-02-08 ENCOUNTER — TELEPHONE (OUTPATIENT)
Dept: ONCOLOGY | Facility: CLINIC | Age: 42
End: 2022-02-08

## 2022-02-08 NOTE — TELEPHONE ENCOUNTER
Per Nereida in billing: Dr. Trejo requested a genetic test Factor 5 and Factor 2 on this patient, but it was denied. I normally ask the MD if they want to just cancel the test, or do an appeal. Sometimes they cancel and sometimes they want an appeal.     D/W Dr. Trejo. Per Dr. Trejo, we will cancel these labs for pt. He will see him in follow up 2/24.

## 2022-02-16 ENCOUNTER — TELEPHONE (OUTPATIENT)
Dept: GASTROENTEROLOGY | Facility: CLINIC | Age: 42
End: 2022-02-16

## 2022-02-16 ENCOUNTER — TELEPHONE (OUTPATIENT)
Dept: ONCOLOGY | Facility: CLINIC | Age: 42
End: 2022-02-16

## 2022-02-16 NOTE — TELEPHONE ENCOUNTER
Discussed results/recommendations with patient. He wasn't aware of having an appt with Dr. Trejo, and he states he's unavailable on 2/24/22; he's going to contact their office to reschedule, hopefully sooner by a telehealth appt and be able to keep appt with sw on 2/22/22. He will follow up with us.

## 2022-02-16 NOTE — TELEPHONE ENCOUNTER
Called patient to discuss results/recommendations from recent CT angiogram of abdomen. He states he had multiple labs drawn last month, and would like to know what all of the results mean. He also requested a follow up visit to discuss treatment plan; follow up visit scheduled for 2/22/22. Please review labs and advise.

## 2022-02-16 NOTE — TELEPHONE ENCOUNTER
"Lelo,  His CT scan shows that all of the blood vessels to his abdomen are open, there is no narrowing or blockage.    This is one of the things that needed to be evaluated to see if this was contributing to the episodes that he has.  We also sent him to hematology to look for reasons in his blood that could cause him to make blood clots or be \"hypercoagulable\" and could cause his episodes.    He has a follow-up visit next week with the hematologist to review all of the blood test results, I recommend that he keep that appointment.    I would recommend that he follow-up with our office after he sees the hematologist, not a couple of days before the appointment (as it is scheduled now) if he still has questions.  Recommend a follow-up visit after his hematology appointment as I would like for him to see the hematologist to follow-up on that testing before he follows up with our office.    Patient should reschedule his appointment with me until after he sees hematologist if he is comfortable with that.  Thank you.    Ashley  "

## 2022-02-16 NOTE — TELEPHONE ENCOUNTER
Provider: DR ELLIS    Caller: AMIRA    Relationship to Patient: SELF    Reason for Call: TYLER HAS AN APPT ON 2-24.     HE STATES IF IT IS GOING OVER TEST RESULTS , HE WOULD LIKE TO GET A TELEHEATLH APPT.    PLEASE ADVISE

## 2022-02-24 ENCOUNTER — TELEMEDICINE (OUTPATIENT)
Dept: ONCOLOGY | Facility: CLINIC | Age: 42
End: 2022-02-24

## 2022-02-24 ENCOUNTER — APPOINTMENT (OUTPATIENT)
Dept: LAB | Facility: HOSPITAL | Age: 42
End: 2022-02-24

## 2022-02-24 DIAGNOSIS — E72.11 HYPERHOMOCYSTEINEMIA: ICD-10-CM

## 2022-02-24 DIAGNOSIS — K55.9 ISCHEMIC COLITIS: Primary | ICD-10-CM

## 2022-02-24 PROCEDURE — 99442 PR PHYS/QHP TELEPHONE EVALUATION 11-20 MIN: CPT | Performed by: INTERNAL MEDICINE

## 2022-02-24 NOTE — PROGRESS NOTES
Subjective Patient contacted by telephone to review results    REASON FOR FOLLOW-UP: Potential ischemic colitis    History of Present Illness   The patient is a 41-year-old male who recently been admitted to [facility] 12/16-18/2021.  He had been having episodes of abdominal pain and bouts of colitis over the previous 10 years-apparent infectious colitis with 4 episodes over that period of time.  Just prior to admission he had episodes of watery diarrhea and rectal bleeding, generalized abdominal pain.  He did receive his COVID-19 booster just prior to this.  CT scan of the abdomen demonstrated colitis extending from splenic flexure to the rectum.  Seen by GI medicine and felt to have ischemic changes without evidence of inflammatory bowel disease.     The patient has been referred for potential hypercoagulable state and discussed with the patient our referenced studies that have been found to include abnormalities of protein S and C abnormalities, fibrinogen, MTHFR, lupus anticoagulant, factor V Leiden, prothrombin gene mutation, ERVIN polymorphisms as potential possibilities.     The patient himself has never had any additional suggestion of thrombosis nor his family members except for perhaps his father who had a previous leg injury.  Several family members have had GI abnormalities for years including diverticulitis and possibly IBS.       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  This is associated with increased risk of coronary disease, venous thromboembolic disease, normal LDH, normal protein C&S levels, negative anticardiolipin antibodies, lupus anticoagulant, beta-2 glycoprotein antibodies and homocystine level found to be considerably elevated to 29.5.  Finally Factor VIII level was normal at 105.    Patient is contacted by telephone after video call unsuccessful.  He states he is doing well but trying to manage his diet  Physical Therapy  Visit Type: initial evaluation  Co-treat with: Occupational therapist  Precautions:  Medical precautions:  fall risk; standard precautions.  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    SUBJECTIVE  Patient agreed to participate in therapy this date.  Pt non-verbal throughout session.   Patient / Family Goal: unable to state  Face, Legs, Activity, Cry, Consolability Scale (FLACC)     Face: 0 - No particular expression or smile    Legs: 0 - Normal position or relaxed    Activity: 0 - Lying quietly, normal position, moves easily    Cry: 0 - No cry (awake or asleep)    Consolability: 0 - Content, relaxed    Score: 0     OBJECTIVE   Level of consciousness: alert    Unable to assess    Affect/Behavior: calm  Functional Communication/Cognition    Overall status:  Impaired    Form of communication:  Receptive deficits and expressive deficits    Commands: does not follow commands.  Additional Details: Pt globally aphasic, L gaze preference. Does not track past midline. Pt with spontaneous L sided mvmt. Minimal withdrawal to pain stimulation on R toes.     Pt mimics commands on L UE following hand over hand guidance and visual demonstration. Such as reaching for therapist's hand and lifting L arm.   Strength (out of 5 unless otherwise indicated)   Comments / Details:  Unable to formally assess d/t global aphasia. Pt does weakly perform L knee extension at EOB given maximal verbal/tactile cueing. R UE/LE flacid    Balance    Sitting: Static: total assist - non-dependent single upper extremity support  Balance Details: totalA for sitting balance at EOB with HOB elevated for R lateral support with wedge under R UE. Pt trialed L sided WBing on elbow to improved midline control. Therapist assisted with cervical rotation R d/t L gaze  and degree of dehydration to avoid further abdominal discomfort.  He recently underwent a CTA of abdomen 2/7/2022 that failed to show abdominal aortic aneurysm, stenosis or additional stenosis in the celiac or superior mesenteric arterial system.    Past Medical History:   Diagnosis Date   • Anxiety    • Asthma    • Colitis    • Hyperlipidemia         Past Surgical History:   Procedure Laterality Date   • COLONOSCOPY N/A 12/18/2021    Procedure: COLONOSCOPY to cecum with random cold biopsies;  Surgeon: Omi Cabello MD;  Location: [facility] ENDOSCOPY;  Service: Gastroenterology;  Laterality: N/A;  pre: colitis, diarhhea, abd. pain  post:  colitis, diverticulosis   • ENDOSCOPY          Current Outpatient Medications on File Prior to Visit   Medication Sig Dispense Refill   • hydrOXYzine pamoate (VISTARIL) 25 MG capsule Take 25 mg by mouth.       No current facility-administered medications on file prior to visit.        ALLERGIES:  No Known Allergies     Social History     Socioeconomic History   • Marital status:      Spouse name: Christine   • Years of education: High school   Tobacco Use   • Smoking status: Former Smoker     Packs/day: 0.50     Years: 18.00     Pack years: 9.00     Types: Cigarettes   • Smokeless tobacco: Never Used   Vaping Use   • Vaping Use: Never used   Substance and Sexual Activity   • Alcohol use: Yes     Comment: socially   • Drug use: Yes     Types: Marijuana   • Sexual activity: Defer        Family History   Family history unknown: Yes        Review of Systems   Constitutional: Positive for activity change and fatigue.   HENT: Negative.    Eyes: Negative.    Respiratory: Negative.    Cardiovascular: Negative.    Gastrointestinal: Positive for abdominal distention, blood in stool, constipation, diarrhea and rectal pain.   Endocrine: Negative.    Genitourinary: Negative.    Musculoskeletal: Negative.    Skin: Negative.    Allergic/Immunologic: Negative.    Neurological: Negative.   preference. Unable to facilitate tracking to R.     Bed Mobility:        Supine to sit: total assist - non-dependent and 2 persons (maxAx2)    Sit to supine: total assist - non-dependent and 2 persons (maxAx2)      Interventions     Training provided: activity tolerance, balance retraining, bed mobility training, compensatory techniques, neuromuscular reeducation, safety training and positioning    Skilled input: Verbal instruction/cues, posture correction and facilitation  Verbal Consent: Writer verbally educated and received verbal consent for hand placement, positioning of patient, and techniques to be performed today from patient for therapist position for techniques as described above and how they are pertinent to the patient's plan of care.       ASSESSMENT    Impairments: strength, activity tolerance, balance deficits and cognition (global aphasia, R hemiplegia, R neglect)  Functional Limitations: all functional mobility     Discharge Recommendations   Recommendation for Discharge: PT IL: (TBD)          PT Identified Barriers to Discharge: command following, global aphasia       Skilled therapy is required to address these limitations in attempt to maximize the patient's independence.  Progress: slow progress, medical status limitations  Predicted patient presentation: Low (stable) - Patient comorbidities and complexities, as defined above, will have little effect on progress for prescribed plan of care.    End of Session:   Location: in bed  Safety measures: alarm system in place/re-engaged, bed rails x4, call light within reach and lines intact  Handoff to: nurse    PLAN    Suggestions for next session as indicated: PT Frequency: 2 days/week  Frequency Comments: 3/12, NCCU, TBD, IPOC2. mRS      Interventions: balance, body mechanics, compensatory technique education, neuromuscular re-education, strengthening, safety education, HEP train/position, bed mobility, functional transfer training and endurance    Hematological: Negative.    Psychiatric/Behavioral: Negative.      Objective     There were no vitals filed for this visit.  Current Status 1/20/2022   ECOG score 0     Previous physical exam  Physical Exam  Constitutional:       Appearance: Normal appearance. He is normal weight.   HENT:      Head: Normocephalic and atraumatic.      Nose: Nose normal.      Mouth/Throat:      Mouth: Mucous membranes are moist.      Pharynx: Oropharynx is clear.   Eyes:      Extraocular Movements: Extraocular movements intact.      Conjunctiva/sclera: Conjunctivae normal.      Pupils: Pupils are equal, round, and reactive to light.   Cardiovascular:      Rate and Rhythm: Normal rate and regular rhythm.      Pulses: Normal pulses.      Heart sounds: Normal heart sounds.   Pulmonary:      Effort: Pulmonary effort is normal.      Breath sounds: Normal breath sounds.   Abdominal:      General: Bowel sounds are normal.      Palpations: Abdomen is soft.   Musculoskeletal:         General: Normal range of motion.      Cervical back: Normal range of motion and neck supple.   Skin:     General: Skin is warm and dry.   Neurological:      General: No focal deficit present.      Mental Status: He is alert and oriented to person, place, and time.   Psychiatric:         Mood and Affect: Mood normal.         Behavior: Behavior normal.         RECENT LABS:  Hematology WBC   Date Value Ref Range Status   01/20/2022 9.85 3.40 - 10.80 10*3/mm3 Final   07/28/2021 13.96 (H) 4.5 - 11.0 10*3/uL Final     RBC   Date Value Ref Range Status   01/20/2022 5.53 4.14 - 5.80 10*6/mm3 Final   07/28/2021 5.69 4.5 - 5.9 10*6/uL Final     Hemoglobin   Date Value Ref Range Status   01/20/2022 16.4 13.0 - 17.7 g/dL Final   07/28/2021 16.9 13.5 - 17.5 g/dL Final     Hematocrit   Date Value Ref Range Status   01/20/2022 47.6 37.5 - 51.0 % Final   07/28/2021 52.9 41.0 - 53.0 % Final     Platelets   Date Value Ref Range Status   01/20/2022 282 140 - 450 10*3/mm3 Final  training  Agreement to plan and goals: patient unable to agree with goals and treatment plan        GOALS  Long Term Goals: (to be met by time of discharge from hospital)  Sit to supine: Patient will complete sit to supine maximal assist and with maximal cues.  Supine to sit: Patient will complete supine to sit maximal assist and with maximal cues.  Sit (edge of bed): Patient will sit at edge of bed for 15 minutes, moderate assist.     Documented in the chart in the following areas: Prior Level of Function. Assessment.      Therapy procedure time and total treatment time can be found documented on the Time Entry flowsheet   "  07/28/2021 302 140 - 440 10*3/uL Final          Assessment/Plan      41-year-old male with a prolonged history perhaps 9 to 10 years of abdominal pain and bouts of colitis.  He has been previously assessed including GI medicine with colonoscopy and endoscopy in 2013 and negative findings but has had progression of these episodes on several occasions that lead to abdominal pain, rectal bleeding to the point that he has to \"rest his gut\" until it passes.  He had recent admission 12/16-18 with the same process with initial scan showing colitis extending from splenic flexure to rectum and unprepped colonoscopy 12/18 with evidence of colitis.  His biopsies suggested possible early ischemic colitis.     He is referred for this possibility and now seen at [facility] office.  We discussed assessments for hypercoagulability but we will process including anticardiolipin antibodies, beta-2 glycoprotein antibodies, factor V Leiden, Factor VIII activity, prothrombin gene mutation, homocystine level, LDH, lupus anticoagulant, MTHFR mutation, plasminogen activator inhibitor and proteins C&S.     The patient is also scheduled for CT angiography of abdomen 1/13/2021.    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  This is associated with increased risk of coronary disease, venous thromboembolic disease, normal LDH, normal protein C&S levels, negative anticardiolipin antibodies, lupus anticoagulant, beta-2 glycoprotein antibodies and homocystine level found to be considerably elevated to 29.5.  Finally Factor VIII level was normal at 105.    Patient is contacted by telephone after video call unsuccessful.  He states he is doing well but trying to manage his diet and degree of dehydration to avoid further abdominal discomfort.  He recently underwent a CTA of abdomen 2/7/2022 that failed to show abdominal aortic aneurysm, stenosis or additional stenosis " in the celiac or superior mesenteric arterial system.    The patient is contacted for the results and potentially would benefit from therapy directed at reducing his homocystine level in particular.    Plan:      *Folgard or equivalent E scribed to pharmacy at 1 tablet daily    *Patient periodically takes additional B vitamins as part of a nutritional cocktail    *Return 4 weeks repeat CMP, homocystine level, cbc    *5 weeks MD    You have chosen to receive care through a telephone visit today. Do you consent to use a telephone visit for your medical care today? Yes     This visit has been rescheduled as a phone visit to comply with patient safety concerns in accordance with CDC recommendations. Total time of discussion was 15 minutes.    13966 is 5-10 minutes  13651 is 11-20 minutes  99937 is 21 or more minutes

## 2022-03-31 ENCOUNTER — LAB (OUTPATIENT)
Dept: LAB | Facility: HOSPITAL | Age: 42
End: 2022-03-31

## 2022-03-31 DIAGNOSIS — E72.11 HYPERHOMOCYSTEINEMIA: ICD-10-CM

## 2022-03-31 DIAGNOSIS — K52.9 COLITIS: ICD-10-CM

## 2022-03-31 DIAGNOSIS — E72.11 HYPERHOMOCYSTEINEMIA: Primary | ICD-10-CM

## 2022-03-31 LAB
ALBUMIN SERPL-MCNC: 4.5 G/DL (ref 3.5–5.2)
ALBUMIN/GLOB SERPL: 1.6 G/DL (ref 1.1–2.4)
ALP SERPL-CCNC: 17 U/L (ref 38–116)
ALT SERPL W P-5'-P-CCNC: 30 U/L (ref 0–41)
ANION GAP SERPL CALCULATED.3IONS-SCNC: 9.5 MMOL/L (ref 5–15)
AST SERPL-CCNC: 25 U/L (ref 0–40)
BASOPHILS # BLD AUTO: 0.07 10*3/MM3 (ref 0–0.2)
BASOPHILS NFR BLD AUTO: 0.8 % (ref 0–1.5)
BILIRUB SERPL-MCNC: 0.3 MG/DL (ref 0.2–1.2)
BUN SERPL-MCNC: 12 MG/DL (ref 6–20)
BUN/CREAT SERPL: 12.2 (ref 7.3–30)
CALCIUM SPEC-SCNC: 10.1 MG/DL (ref 8.5–10.2)
CHLORIDE SERPL-SCNC: 102 MMOL/L (ref 98–107)
CO2 SERPL-SCNC: 28.5 MMOL/L (ref 22–29)
CREAT SERPL-MCNC: 0.98 MG/DL (ref 0.7–1.3)
DEPRECATED RDW RBC AUTO: 42.6 FL (ref 37–54)
EGFRCR SERPLBLD CKD-EPI 2021: 98.7 ML/MIN/1.73
EOSINOPHIL # BLD AUTO: 0.35 10*3/MM3 (ref 0–0.4)
EOSINOPHIL NFR BLD AUTO: 4.1 % (ref 0.3–6.2)
ERYTHROCYTE [DISTWIDTH] IN BLOOD BY AUTOMATED COUNT: 13 % (ref 12.3–15.4)
GLOBULIN UR ELPH-MCNC: 2.8 GM/DL (ref 1.8–3.5)
GLUCOSE SERPL-MCNC: 109 MG/DL (ref 74–124)
HCT VFR BLD AUTO: 49.1 % (ref 37.5–51)
HCYS SERPL-MCNC: 9.8 UMOL/L (ref 0–15)
HGB BLD-MCNC: 16.5 G/DL (ref 13–17.7)
IMM GRANULOCYTES # BLD AUTO: 0.03 10*3/MM3 (ref 0–0.05)
IMM GRANULOCYTES NFR BLD AUTO: 0.4 % (ref 0–0.5)
LYMPHOCYTES # BLD AUTO: 2.37 10*3/MM3 (ref 0.7–3.1)
LYMPHOCYTES NFR BLD AUTO: 27.9 % (ref 19.6–45.3)
MCH RBC QN AUTO: 29.8 PG (ref 26.6–33)
MCHC RBC AUTO-ENTMCNC: 33.6 G/DL (ref 31.5–35.7)
MCV RBC AUTO: 88.6 FL (ref 79–97)
MONOCYTES # BLD AUTO: 0.57 10*3/MM3 (ref 0.1–0.9)
MONOCYTES NFR BLD AUTO: 6.7 % (ref 5–12)
NEUTROPHILS NFR BLD AUTO: 5.09 10*3/MM3 (ref 1.7–7)
NEUTROPHILS NFR BLD AUTO: 60.1 % (ref 42.7–76)
NRBC BLD AUTO-RTO: 0 /100 WBC (ref 0–0.2)
PLATELET # BLD AUTO: 319 10*3/MM3 (ref 140–450)
PMV BLD AUTO: 9 FL (ref 6–12)
POTASSIUM SERPL-SCNC: 4.4 MMOL/L (ref 3.5–4.7)
PROT SERPL-MCNC: 7.3 G/DL (ref 6.3–8)
RBC # BLD AUTO: 5.54 10*6/MM3 (ref 4.14–5.8)
SODIUM SERPL-SCNC: 140 MMOL/L (ref 134–145)
WBC NRBC COR # BLD: 8.48 10*3/MM3 (ref 3.4–10.8)

## 2022-03-31 PROCEDURE — 80053 COMPREHEN METABOLIC PANEL: CPT

## 2022-03-31 PROCEDURE — 85025 COMPLETE CBC W/AUTO DIFF WBC: CPT

## 2022-03-31 PROCEDURE — 83090 ASSAY OF HOMOCYSTEINE: CPT | Performed by: INTERNAL MEDICINE

## 2022-03-31 PROCEDURE — 36415 COLL VENOUS BLD VENIPUNCTURE: CPT

## 2022-04-07 ENCOUNTER — APPOINTMENT (OUTPATIENT)
Dept: LAB | Facility: HOSPITAL | Age: 42
End: 2022-04-07

## 2024-11-15 NOTE — ED NOTES
Patient was wearing a face mask throughout our encounter.  This RN wore appropriate PPE throughout the encounter.  Hand hygiene was performed before and after patient encounter.        Jacinta Jennings RN  12/16/21 [id number]     Warm

## (undated) DEVICE — KT ORCA ORCAPOD DISP STRL

## (undated) DEVICE — ADAPT CLN BIOGUARD AIR/H2O DISP

## (undated) DEVICE — TUBING, SUCTION, 1/4" X 10', STRAIGHT: Brand: MEDLINE

## (undated) DEVICE — CANNULA,OXY,ADULT,SUPER SOFT,W/14'TUB,UC: Brand: MEDLINE INDUSTRIES, INC.

## (undated) DEVICE — SENSR O2 OXIMAX FNGR A/ 18IN NONSTR

## (undated) DEVICE — SINGLE-USE BIOPSY FORCEPS: Brand: RADIAL JAW 4

## (undated) DEVICE — LN SMPL CO2 SHTRM SD STREAM W/M LUER

## (undated) DEVICE — CANN O2 ETCO2 FITS ALL CONN CO2 SMPL A/ 7IN DISP LF